# Patient Record
Sex: FEMALE | Race: WHITE | Employment: UNEMPLOYED | URBAN - METROPOLITAN AREA
[De-identification: names, ages, dates, MRNs, and addresses within clinical notes are randomized per-mention and may not be internally consistent; named-entity substitution may affect disease eponyms.]

---

## 2016-07-06 LAB
ANTIBODY SCREEN, EXTERNAL: NEGATIVE
CHLAMYDIA, EXTERNAL: NEGATIVE
HBSAG, EXTERNAL: NEGATIVE
HIV, EXTERNAL: NEGATIVE
N. GONORRHEA, EXTERNAL: NEGATIVE
RPR, EXTERNAL: NON REACTIVE
RUBELLA, EXTERNAL: NORMAL

## 2016-12-15 LAB — GRBS, EXTERNAL: POSITIVE

## 2017-01-15 ENCOUNTER — HOSPITAL ENCOUNTER (OUTPATIENT)
Age: 22
Setting detail: OBSERVATION
Discharge: HOME | End: 2017-01-15
Attending: OBSTETRICS & GYNECOLOGY | Admitting: OBSTETRICS & GYNECOLOGY
Payer: MEDICAID

## 2017-01-15 VITALS
TEMPERATURE: 98.2 F | HEIGHT: 68 IN | RESPIRATION RATE: 20 BRPM | SYSTOLIC BLOOD PRESSURE: 147 MMHG | WEIGHT: 232 LBS | HEART RATE: 96 BPM | BODY MASS INDEX: 35.16 KG/M2 | DIASTOLIC BLOOD PRESSURE: 80 MMHG

## 2017-01-15 PROCEDURE — 99218 HC RM OBSERVATION: CPT

## 2017-01-15 PROCEDURE — 99283 EMERGENCY DEPT VISIT LOW MDM: CPT | Performed by: OBSTETRICS & GYNECOLOGY

## 2017-01-15 PROCEDURE — 59025 FETAL NON-STRESS TEST: CPT | Performed by: OBSTETRICS & GYNECOLOGY

## 2017-01-15 NOTE — IP AVS SNAPSHOT
Michael Marquis 
 
 
 [address] 
[phone] Patient: Oc Villalobos MRN: [id number] :1995 You are allergic to the following Allergen Reactions Dilaudid (Hydromorphone (Bulk)) Rash Recent Documentation Height Weight BMI OB Status Smoking Status 1.727 m 105.2 kg 35.28 kg/m2 Pregnant Former Smoker Emergency Contacts Name Discharge Info Relation Home Work Mobile Sameera Cha  Parent [1] [phone] About your hospitalization You were admitted on:  January 15, 2017 You last received care in the:  [facility] 2 LABOR & DELIVERY You were discharged on:  January 15, 2017 Unit phone number:  [phone] Why you were hospitalized Your primary diagnosis was:  Not on File Providers Seen During Your Hospitalizations Provider Role Specialty Primary office phone Devaughn Montoya MD Attending Provider Obstetrics & Gynecology [phone] Your Primary Care Physician (PCP) Primary Care Physician Office Phone Office Fax Cody Bessie [phone] Follow-up Information Follow up With Details Comments Contact Info Devaughn Montoya MD   [address] [place] [address] 
[phone] Current Discharge Medication List  
  
STOP taking these medications FIORICET PO  
   
  
 ibuprofen 800 mg tablet Commonly known as:  MOTRIN  
   
  
 oxyCODONE-acetaminophen 5-325 mg per tablet Commonly known as:  PERCOCET Discharge Instructions Please keep you appointment on 2017 with your physician. Weeks 34 to 36 of Your Pregnancy: Care Instructions Your Care Instructions By now, your baby and your belly have grown quite large. It is almost time to give birth. A full-term pregnancy can deliver between 37 and 42 weeks. Your baby's lungs are almost ready to breathe air. The bones in your baby's head are now firm enough to protect it, but soft enough to move down through the birth canal. 
You may feel excited, happy, anxious, or scared. You may wonder how you will know if you are in labor or what to expect during labor. Try to be flexible in your expectations of the birth. Because each birth is different, there is no way to know exactly what childbirth will be like for you. This care sheet will help you know what to expect and how to prepare. This may make your childbirth easier. If you haven't already had the Tdap shot during this pregnancy, talk to your doctor about getting it. It will help protect your  against pertussis infection. In the 36th week, most women have a test for group B streptococcus (GBS). GBS is a common bacteria that can live in the vagina and rectum. It can make your baby sick after birth. If you test positive, you will get antibiotics during labor. The medicine will keep your baby from getting the bacteria. Follow-up care is a key part of your treatment and safety. Be sure to make and go to all appointments, and call your doctor if you are having problems. It's also a good idea to know your test results and keep a list of the medicines you take. How can you care for yourself at home? Learn about pain relief choices · Pain is different for every woman. Talk with your doctor about your feelings about pain. · You can choose from several types of pain relief. These include medicine or breathing techniques, as well as comfort measures. You can use more than one option. · If you choose to have pain medicine during labor, talk to your doctor about your options. Some medicines lower anxiety and help with some of the pain. Others make your lower body numb so that you won't feel pain.  
· Be sure to tell your doctor about your pain medicine choice before you start labor or very early in your labor. You may be able to change your mind as labor progresses. · Rarely, a woman is put to sleep by medicine given through a mask or an IV. Labor and delivery · The first stage of labor has three parts: early, active, and transition. ¨ Most women have early labor at home. You can stay busy or rest, eat light snacks, drink clear fluids, and start counting contractions. ¨ When talking during a contraction gets hard, you may be moving to active labor. During active labor, you should head for the hospital if you are not there already. ¨ You are in active labor when contractions come every 3 to 4 minutes and last about 60 seconds. Your cervix is opening more rapidly. ¨ If your water breaks, contractions will come faster and stronger. ¨ During transition, your cervix is stretching, and contractions are coming more rapidly. ¨ You may want to push, but your cervix might not be ready. Your doctor will tell you when to push. · The second stage starts when your cervix is completely opened and you are ready to push. ¨ Contractions are very strong to push the baby down the birth canal. 
¨ You will feel the urge to push. You may feel like you need to have a bowel movement. ¨ You may be coached to push with contractions. These contractions will be very strong, but you will not have them as often. You can get a little rest between contractions. ¨ You may be emotional and irritable. You may not be aware of what is going on around you. ¨ One last push, and your baby is born. · The third stage is when a few more contractions push out the placenta. This may take 30 minutes or less. · The fourth stage is the welcome recovery. You may feel overwhelmed with emotions and exhausted but alert. This is a good time to start breastfeeding. Where can you learn more? Go to http://dante-rachel.info/.  
Enter M410 in the search box to learn more about \"Weeks 34 to 39 of Your Pregnancy: Care Instructions. \" Current as of: May 30, 2016 Content Version: 11.1 © 7428-2256 [employer]. Care instructions adapted under license by [employer] (which disclaims liability or warranty for this information). If you have questions about a medical condition or this instruction, always ask your healthcare professional. [address] any warranty or liability for your use of this information. Counting Your Baby's Kicks: Care Instructions Your Care Instructions Counting your baby's kicks is one way your doctor can tell that your baby is healthy. Most womenespecially in a first pregnancyfeel their baby move for the first time between 16 and 22 weeks. The movement may feel like flutters rather than kicks. Your baby may move more at certain times of the day. When you are active, you may notice less kicking than when you are resting. At your prenatal visits, your doctor will ask whether the baby is active. In your last trimester, your doctor may ask you to count the number of times you feel your baby move. Follow-up care is a key part of your treatment and safety. Be sure to make and go to all appointments, and call your doctor if you are having problems. It's also a good idea to know your test results and keep a list of the medicines you take. How do you count fetal kicks? · A common method of checking your baby's movement is to count the number of kicks or moves you feel in 1 hour. Ten movements (such as kicks, flutters, or rolls) in 1 hour are normal. Some doctors suggest that you count in the morning until you get to 10 movements. Then you can quit for that day and start again the next day. · Pick your baby's most active time of day to count. This may be any time from morning to evening. · If you do not feel 10 movements in an hour, your baby may be sleeping. Wait for the next hour and count again. When should you call for help? Call your doctor now or seek immediate medical care if: 
· You noticed that your baby has stopped moving or is moving much less than normal. 
Watch closely for changes in your health, and be sure to contact your doctor if you have any problems. Where can you learn more? Go to http://dante-rachel.info/. Enter N501 in the search box to learn more about \"Counting Your Baby's Kicks: Care Instructions. \" Current as of: May 30, 2016 Content Version: 11.1 © 7263-5494 [employer]. Care instructions adapted under license by [employer]icle (which disclaims liability or warranty for this information). If you have questions about a medical condition or this instruction, always ask your healthcare professional. [address] any warranty or liability for your use of this information. Discharge Orders None Introducing [facility]! Kris Lawrence introduces MyFitnessPal patient portal. Now you can access parts of your medical record, email your doctor's office, and request medication refills online. 1. In your internet browser, go to [email or web address] 2. Click on the First Time User? Click Here link in the Sign In box. You will see the New Member Sign Up page. 3. Enter your MyFitnessPal Access Code exactly as it appears below. You will not need to use this code after youve completed the sign-up process. If you do not sign up before the expiration date, you must request a new code. · MyFitnessPal Access Code: [email or web address] Expires: 4/15/2017  7:16 PM 
 
4. Enter the last four digits of your Social Security Number (xxxx) and Date of Birth (mm/dd/yyyy) as indicated and click Submit. You will be taken to the next sign-up page. 5. Create a FedTaxt ID. This will be your MyFitnessPal login ID and cannot be changed, so think of one that is secure and easy to remember. 6. Create a FedTaxt password. You can change your password at any time. 7. Enter your Password Reset Question and Answer. This can be used at a later time if you forget your password. 8. Enter your e-mail address. You will receive e-mail notification when new information is available in [address] 9. Click Sign Up. You can now view and download portions of your medical record. 10. Click the Download Summary menu link to download a portable copy of your medical information. If you have questions, please visit the Frequently Asked Questions section of the [employer] website. Remember, [employer] is NOT to be used for urgent needs. For medical emergencies, dial 911. Now available from your iPhone and Android! General Information Please provide this summary of care documentation to your next provider. Patient Signature:  ____________________________________________________________ Date:  ____________________________________________________________  
  
[employer] Provider Signature:  ____________________________________________________________ Date:  ____________________________________________________________

## 2017-01-16 NOTE — H&P
History & Physical    Name: Jay Doherty MRN: [id number]  SSN: [id number]    YOB: 1995  Age: 19 y.o. Sex: female        Subjective:     Estimated Date of Delivery: 17  OB History    Para Term  AB SAB TAB Ectopic Multiple Living   3 1 1       1      # Outcome Date GA Lbr Kavin/2nd Weight Sex Delivery Anes PTL Lv   3 Current            2 Term 10/31/11 40w0d 12:15 / 00:56 3.365 kg M VAGINAL DELI EPIDURAL AN N Y   1                    Ms. Mat Hernandez is admitted with pregnancy at 35w5d for labor check. Prenatal course was normal. Please see prenatal records for details. Past Medical History   Diagnosis Date    Gestational hypertension 2015    Skin abscess      grew slight MRSA     No past surgical history on file. Social History     Occupational History     [employer]     Social History Main Topics    Smoking status: Former Smoker     Packs/day: 0.50     Types: Cigarettes     Quit date: 2012    Smokeless tobacco: Never Used    Alcohol use No    Drug use: No    Sexual activity: Yes     Partners: Male     Birth control/ protection: Condom     Family History   Problem Relation Age of Onset    Hypertension Father        Allergies   Allergen Reactions    Dilaudid [Hydromorphone (Bulk)] Rash     Prior to Admission medications    Medication Sig Start Date End Date Taking? Authorizing Provider   ibuprofen (MOTRIN) 800 mg tablet Take 1 Tab by mouth every eight (8) hours as needed for Pain. 3/9/15   Catalina Bender MD   oxyCODONE-acetaminophen (PERCOCET) 5-325 mg per tablet Take 2 Tabs by mouth every six (6) hours as needed. Max Daily Amount: 8 Tabs. 3/9/15   Catalina Bender MD   BUTALB/ACETAMINOPHEN/CAFFEINE (FIORICET PO) Take 1 capsule by mouth every eight (8) hours as needed (headache).     Historical Provider        Review of Systems: Constitutional: negative  Respiratory: negative  Cardiovascular: negative  Gastrointestinal: negative  Genitourinary:negative  Hematologic/lymphatic: negative  Musculoskeletal:negative  Neurological: negative  Behavioral/Psych: negative  Endocrine: negative    Objective:     Vitals:  Vitals:    01/15/17 1819 01/15/17 1821 01/15/17 1822   BP: 147/80  147/80   Pulse: 96  96   Resp:   20   Temp:   98.2 °F (36.8 °C)   Weight:  105.2 kg (232 lb)    Height:  5' 8\" (1.727 m)         Physical Exam:  Patient without distress. Heart: Regular rate and rhythm  Lung: clear to auscultation throughout lung fields and normal respiratory effort  Abdomen: soft, nontender  Fundus: soft and non tender  Perineum: blood absent, amniotic fluid absent  Cervical Exam: Closed/Thick/High  Membranes:  Intact  Fetal Heart Rate: cat I tracing without decels mild ctx noted    Prenatal Labs:   Lab Results   Component Value Date/Time    Rubella, External immune 07/06/2016    GrBStrep, External positive 12/15/2016    HBsAg, External negative 07/06/2016    HIV, External negative 07/06/2016    RPR, External non reactive 07/06/2016    Gonorrhea, External negative 07/06/2016    Chlamydia, External negative 07/06/2016    ABO,Rh A positive 03/30/2011         Assessment/Plan:     Active Problems:    * No active hospital problems.  *       Plan: Patient dehydrated po hydrated feeling better Discharge to home with office f/u    Signed By:  Margarita David MD     January 15, 2017

## 2017-01-16 NOTE — PROGRESS NOTES
1900  Bedside and Verbal shift change report given to Sravani Aden RN (oncoming nurse) by Deniz Hall RN (offgoing nurse). Report included the following information SBAR, Kardex, Intake/Output, MAR, Recent Results and Med Rec Status. Patient in bed, family at bedside, drinking water and states that she feels much better. No needs expressed at this time. 1902  Dr Radha Palacio at nurses station, updated on patient, she went in to see patient and discuss discharge.

## 2017-01-16 NOTE — DISCHARGE INSTRUCTIONS
Please keep you appointment on 2017 with your physician. Weeks 34 to 36 of Your Pregnancy: Care Instructions  Your Care Instructions     By now, your baby and your belly have grown quite large. It is almost time to give birth. A full-term pregnancy can deliver between 37 and 42 weeks. Your baby's lungs are almost ready to breathe air. The bones in your baby's head are now firm enough to protect it, but soft enough to move down through the birth canal.  You may feel excited, happy, anxious, or scared. You may wonder how you will know if you are in labor or what to expect during labor. Try to be flexible in your expectations of the birth. Because each birth is different, there is no way to know exactly what childbirth will be like for you. This care sheet will help you know what to expect and how to prepare. This may make your childbirth easier. If you haven't already had the Tdap shot during this pregnancy, talk to your doctor about getting it. It will help protect your  against pertussis infection. In the 36th week, most women have a test for group B streptococcus (GBS). GBS is a common bacteria that can live in the vagina and rectum. It can make your baby sick after birth. If you test positive, you will get antibiotics during labor. The medicine will keep your baby from getting the bacteria. Follow-up care is a key part of your treatment and safety. Be sure to make and go to all appointments, and call your doctor if you are having problems. It's also a good idea to know your test results and keep a list of the medicines you take. How can you care for yourself at home? Learn about pain relief choices  · Pain is different for every woman. Talk with your doctor about your feelings about pain. · You can choose from several types of pain relief. These include medicine or breathing techniques, as well as comfort measures. You can use more than one option.   · If you choose to have pain medicine during labor, talk to your doctor about your options. Some medicines lower anxiety and help with some of the pain. Others make your lower body numb so that you won't feel pain. · Be sure to tell your doctor about your pain medicine choice before you start labor or very early in your labor. You may be able to change your mind as labor progresses. · Rarely, a woman is put to sleep by medicine given through a mask or an IV. Labor and delivery  · The first stage of labor has three parts: early, active, and transition. ¨ Most women have early labor at home. You can stay busy or rest, eat light snacks, drink clear fluids, and start counting contractions. ¨ When talking during a contraction gets hard, you may be moving to active labor. During active labor, you should head for the hospital if you are not there already. ¨ You are in active labor when contractions come every 3 to 4 minutes and last about 60 seconds. Your cervix is opening more rapidly. ¨ If your water breaks, contractions will come faster and stronger. ¨ During transition, your cervix is stretching, and contractions are coming more rapidly. ¨ You may want to push, but your cervix might not be ready. Your doctor will tell you when to push. · The second stage starts when your cervix is completely opened and you are ready to push. ¨ Contractions are very strong to push the baby down the birth canal.  ¨ You will feel the urge to push. You may feel like you need to have a bowel movement. ¨ You may be coached to push with contractions. These contractions will be very strong, but you will not have them as often. You can get a little rest between contractions. ¨ You may be emotional and irritable. You may not be aware of what is going on around you. ¨ One last push, and your baby is born. · The third stage is when a few more contractions push out the placenta. This may take 30 minutes or less. · The fourth stage is the welcome recovery. You may feel overwhelmed with emotions and exhausted but alert. This is a good time to start breastfeeding. Where can you learn more? Go to http://dante-rachel.info/. Enter G882 in the search box to learn more about \"Weeks 34 to 36 of Your Pregnancy: Care Instructions. \"  Current as of: May 30, 2016  Content Version: 11.1  © 2327-4048 [employer]. Care instructions adapted under license by Vision Source (which disclaims liability or warranty for this information). If you have questions about a medical condition or this instruction, always ask your healthcare professional. [place] [address] any warranty or liability for your use of this information. Counting Your Baby's Kicks: Care Instructions  Your Care Instructions  Counting your baby's kicks is one way your doctor can tell that your baby is healthy. Most women--especially in a first pregnancy--feel their baby move for the first time between 16 and 22 weeks. The movement may feel like flutters rather than kicks. Your baby may move more at certain times of the day. When you are active, you may notice less kicking than when you are resting. At your prenatal visits, your doctor will ask whether the baby is active. In your last trimester, your doctor may ask you to count the number of times you feel your baby move. Follow-up care is a key part of your treatment and safety. Be sure to make and go to all appointments, and call your doctor if you are having problems. It's also a good idea to know your test results and keep a list of the medicines you take. How do you count fetal kicks? · A common method of checking your baby's movement is to count the number of kicks or moves you feel in 1 hour. Ten movements (such as kicks, flutters, or rolls) in 1 hour are normal. Some doctors suggest that you count in the morning until you get to 10 movements.  Then you can quit for that day and start again the next day.  · Pick your baby's most active time of day to count. This may be any time from morning to evening. · If you do not feel 10 movements in an hour, your baby may be sleeping. Wait for the next hour and count again. When should you call for help? Call your doctor now or seek immediate medical care if:  · You noticed that your baby has stopped moving or is moving much less than normal.  Watch closely for changes in your health, and be sure to contact your doctor if you have any problems. Where can you learn more? Go to http://dante-rachel.info/. Enter Y433 in the search box to learn more about \"Counting Your Baby's Kicks: Care Instructions. \"  Current as of: May 30, 2016  Content Version: 11.1  © 6597-7068 [employer]. Care instructions adapted under license by BiiCode (which disclaims liability or warranty for this information). If you have questions about a medical condition or this instruction, always ask your healthcare professional. [address] any warranty or liability for your use of this information.

## 2017-01-27 ENCOUNTER — HOSPITAL ENCOUNTER (OUTPATIENT)
Age: 22
Setting detail: OBSERVATION
Discharge: HOME | End: 2017-01-27
Attending: OBSTETRICS & GYNECOLOGY | Admitting: OBSTETRICS & GYNECOLOGY
Payer: MEDICAID

## 2017-01-27 VITALS
DIASTOLIC BLOOD PRESSURE: 61 MMHG | WEIGHT: 236 LBS | BODY MASS INDEX: 35.77 KG/M2 | TEMPERATURE: 98.2 F | HEIGHT: 68 IN | RESPIRATION RATE: 16 BRPM | SYSTOLIC BLOOD PRESSURE: 121 MMHG | HEART RATE: 80 BPM

## 2017-01-27 LAB
ALBUMIN SERPL BCP-MCNC: 2.4 G/DL (ref 3.5–5)
ALBUMIN/GLOB SERPL: 0.7 {RATIO} (ref 1.1–2.2)
ALP SERPL-CCNC: 99 U/L (ref 45–117)
ALT SERPL-CCNC: 15 U/L (ref 12–78)
ANION GAP BLD CALC-SCNC: 11 MMOL/L (ref 5–15)
AST SERPL W P-5'-P-CCNC: 13 U/L (ref 15–37)
BASOPHILS # BLD AUTO: 0 K/UL (ref 0–0.1)
BASOPHILS # BLD: 0 % (ref 0–1)
BILIRUB SERPL-MCNC: 0.2 MG/DL (ref 0.2–1)
BUN SERPL-MCNC: 4 MG/DL (ref 6–20)
BUN/CREAT SERPL: 7 (ref 12–20)
CALCIUM SERPL-MCNC: 7.8 MG/DL (ref 8.5–10.1)
CHLORIDE SERPL-SCNC: 102 MMOL/L (ref 97–108)
CO2 SERPL-SCNC: 25 MMOL/L (ref 21–32)
CREAT SERPL-MCNC: 0.54 MG/DL (ref 0.55–1.02)
CREAT UR-MCNC: 23.49 MG/DL
EOSINOPHIL # BLD: 0 K/UL (ref 0–0.4)
EOSINOPHIL NFR BLD: 1 % (ref 0–7)
ERYTHROCYTE [DISTWIDTH] IN BLOOD BY AUTOMATED COUNT: 14.3 % (ref 11.5–14.5)
GLOBULIN SER CALC-MCNC: 3.5 G/DL (ref 2–4)
GLUCOSE SERPL-MCNC: 69 MG/DL (ref 65–100)
HCT VFR BLD AUTO: 35.7 % (ref 35–47)
HGB BLD-MCNC: 11.2 G/DL (ref 11.5–16)
LDH SERPL L TO P-CCNC: 133 U/L (ref 81–246)
LYMPHOCYTES # BLD AUTO: 16 % (ref 12–49)
LYMPHOCYTES # BLD: 1.2 K/UL (ref 0.8–3.5)
MCH RBC QN AUTO: 26.4 PG (ref 26–34)
MCHC RBC AUTO-ENTMCNC: 31.4 G/DL (ref 30–36.5)
MCV RBC AUTO: 84 FL (ref 80–99)
MONOCYTES # BLD: 0.7 K/UL (ref 0–1)
MONOCYTES NFR BLD AUTO: 9 % (ref 5–13)
NEUTS SEG # BLD: 5.7 K/UL (ref 1.8–8)
NEUTS SEG NFR BLD AUTO: 74 % (ref 32–75)
PLATELET # BLD AUTO: 177 K/UL (ref 150–400)
POTASSIUM SERPL-SCNC: 3.8 MMOL/L (ref 3.5–5.1)
PROT SERPL-MCNC: 5.9 G/DL (ref 6.4–8.2)
PROT UR-MCNC: <6 MG/DL (ref 0–11.9)
PROT/CREAT UR-RTO: NORMAL
RBC # BLD AUTO: 4.25 M/UL (ref 3.8–5.2)
SODIUM SERPL-SCNC: 138 MMOL/L (ref 136–145)
URATE SERPL-MCNC: 2.8 MG/DL (ref 2.6–6)
WBC # BLD AUTO: 7.7 K/UL (ref 3.6–11)

## 2017-01-27 PROCEDURE — 85025 COMPLETE CBC W/AUTO DIFF WBC: CPT | Performed by: OBSTETRICS & GYNECOLOGY

## 2017-01-27 PROCEDURE — 84156 ASSAY OF PROTEIN URINE: CPT | Performed by: OBSTETRICS & GYNECOLOGY

## 2017-01-27 PROCEDURE — 36415 COLL VENOUS BLD VENIPUNCTURE: CPT | Performed by: OBSTETRICS & GYNECOLOGY

## 2017-01-27 PROCEDURE — 99285 EMERGENCY DEPT VISIT HI MDM: CPT | Performed by: OBSTETRICS & GYNECOLOGY

## 2017-01-27 PROCEDURE — 80053 COMPREHEN METABOLIC PANEL: CPT | Performed by: OBSTETRICS & GYNECOLOGY

## 2017-01-27 PROCEDURE — 83615 LACTATE (LD) (LDH) ENZYME: CPT | Performed by: OBSTETRICS & GYNECOLOGY

## 2017-01-27 PROCEDURE — 99218 HC RM OBSERVATION: CPT

## 2017-01-27 PROCEDURE — 59025 FETAL NON-STRESS TEST: CPT | Performed by: OBSTETRICS & GYNECOLOGY

## 2017-01-27 PROCEDURE — 84550 ASSAY OF BLOOD/URIC ACID: CPT | Performed by: OBSTETRICS & GYNECOLOGY

## 2017-01-27 NOTE — PROGRESS NOTES
1033: Patient arrived to unit. Oriented to unit, nurse, and plan of care. Patient verbalized understanding. 1115-urine pro/cr ratio sent to lab. 24 hour started at this patient. Patient instructed how to collect 24 hour urine. 12:20- received a call from lab stating that the urine pro/cr ratio could not be calculated because the the protein was too low. 12:23-RN notified patient of labs. Patient wants to know if she can go home and will she have to complete a 24 hour urine. 1230-Jannie MANNING on floor notified of BPs, CMP, CBC, Pro/Cr ratio unable to obtain because of low Protein. Jannie MANNING states she will d/c patient and that patient does not need to complete her 24 hour urine. VORB.     1300-Written and Verbal Discharge instructions on pregnancy precautions, fetal movement counts, hypertension/pre-E. Patient verbalized understanding. Copy given to patient and original placed on chart.

## 2017-01-27 NOTE — PROGRESS NOTES
Pt seen and examined. She was sent for r/o preE after having a mild range blood pressure in the office. States she was seen for a problem visit for cramping and pelvic pressure, but currently feels better. Here, she has had all normal BPs. Her preE serology has returned all normal and her urine protein was so low that a urine p/c ratio could not even be calculated. FHR cat 1, reactive  Will discharge home with preE precautions and FKCs. She understands and feels comfortable going home. She has an appointment in the office with Dr. Crissy House in 4 days.     Bonny Roblero MD

## 2017-01-27 NOTE — DISCHARGE INSTRUCTIONS
High Blood Pressure in Pregnancy: Care Instructions  Your Care Instructions  High blood pressure (hypertension) means that the force of blood against your artery walls is too strong. Mild high blood pressure during pregnancy is not usually dangerous. Your doctor will probably just want to watch you closely. But when blood pressure is very high, it can reduce oxygen to your baby. This can affect how well your baby grows. High blood pressure also means that you are at higher risk for:  · Preeclampsia. This is a problem that includes high blood pressure and damage to your liver or kidneys. It can also reduce how much oxygen your baby gets. In some cases, it leads to eclampsia. Eclampsia causes seizures. · Placental abruption. This is a problem when the placenta separates from the uterus before birth. It prevents the baby from getting enough oxygen and nutrients. Sometimes it can cause death for the baby and the mother. To prevent problems for you or your baby, you will have to check your blood pressure often. You will do this until after your baby is born. If your blood pressure rises suddenly or is very high during your pregnancy, your doctor may prescribe medicines. They can usually control blood pressure. If your blood pressure affects your or your baby's health, your doctor may need to deliver your baby early. After your baby is born, your blood pressure will probably improve. But sometimes blood pressure problems continue after birth. Follow-up care is a key part of your treatment and safety. Be sure to make and go to all appointments, and call your doctor if you are having problems. It's also a good idea to know your test results and keep a list of the medicines you take. How can you care for yourself at home? · Take and write down your blood pressure at home if your doctor tells you to. · Take your medicines exactly as prescribed.  Call your doctor if you think you are having a problem with your medicine. · Do not smoke. If you need help quitting, talk to your doctor about stop-smoking programs and medicines. These can increase your chances of quitting for good. · Do not gain too much weight during your pregnancy. Talk to your doctor about how much weight gain is healthy. · Eat a healthy diet. · Don't use a lot of salt. Take the salt shaker off the table. · Get regular, mild exercise. Walking or swimming several times a week can be healthy for you and your baby. · Reduce stress, and find time to relax. This is very important if you continue to work or have a busy schedule. It's also important if you have small children at home. When should you call for help? Call 911 anytime you think you may need emergency care. For example, call if:  · You passed out (lost consciousness). · You have a seizure. Call your doctor now or seek immediate medical care if:  · You have symptoms of preeclampsia, such as:  ¨ Sudden swelling of your face, hands, or feet. ¨ New vision problems (such as dimness or blurring). ¨ A severe headache. · Your blood pressure is higher than it should be or rises suddenly. · You have new nausea or vomiting. · You think that you are in labor. · You have new belly pain. Watch closely for changes in your health, and be sure to contact your doctor if:  · You gain weight rapidly. Where can you learn more? Go to http://dante-rachel.info/. Enter [phone] in the search box to learn more about \"High Blood Pressure in Pregnancy: Care Instructions. \"  Current as of: May 30, 2016  Content Version: 11.1  © 6307-6888 [employer]. Care instructions adapted under license by [employer] (which disclaims liability or warranty for this information). If you have questions about a medical condition or this instruction, always ask your healthcare professional. [address] any warranty or liability for your use of this information. Pregnancy Precautions: Care Instructions  Your Care Instructions  There is no sure way to prevent labor before your due date ( labor) or to prevent most other pregnancy problems. But there are things you can do to increase your chances of a healthy pregnancy. Go to your appointments, follow your doctor's advice, and take good care of yourself. Eat well, and exercise (if your doctor agrees). And make sure to drink plenty of water. Follow-up care is a key part of your treatment and safety. Be sure to make and go to all appointments, and call your doctor if you are having problems. It's also a good idea to know your test results and keep a list of the medicines you take. How can you care for yourself at home? · Make sure you go to your prenatal appointments. At each visit, your doctor will check your blood pressure. Your doctor will also check to see if you have protein in your urine. High blood pressure and protein in urine are signs of preeclampsia. This condition can be dangerous for you and your baby. · Drink plenty of fluids, enough so that your urine is light yellow or clear like water. Dehydration can cause contractions. If you have kidney, heart, or liver disease and have to limit fluids, talk with your doctor before you increase the amount of fluids you drink. · Tell your doctor right away if you notice any symptoms of an infection, such as:  ¨ Burning when you urinate. ¨ A foul-smelling discharge from your vagina. ¨ Vaginal itching. ¨ Unexplained fever. ¨ Unusual pain or soreness in your uterus or lower belly. · Eat a balanced diet. Include plenty of foods that are high in calcium and iron. ¨ Foods high in calcium include milk, cheese, yogurt, almonds, and broccoli. ¨ Foods high in iron include red meat, shellfish, poultry, eggs, beans, raisins, whole-grain bread, and leafy green vegetables. · Do not smoke.  If you need help quitting, talk to your doctor about stop-smoking programs and medicines. These can increase your chances of quitting for good. · Do not drink alcohol or use illegal drugs. · Follow your doctor's directions about activity. Your doctor will let you know how much, if any, exercise you can do. · Ask your doctor if you can have sex. If you are at risk for early labor, your doctor may ask you to not have sex. · Take care to prevent falls. During pregnancy, your joints are loose, and your balance is off. Sports such as bicycling, skiing, or in-line skating can increase your risk of falling. And don't ride horses or motorcycles, dive, water ski, scuba dive, or parachute jump while you are pregnant. · Avoid getting very hot. Do not use saunas or hot tubs. Avoid staying out in the sun in hot weather for long periods. Take acetaminophen (Tylenol) to lower a high fever. · Do not take any over-the-counter or herbal medicines or supplements without talking to your doctor or pharmacist first.  When should you call for help? Call 911 anytime you think you may need emergency care. For example, call if:  · You passed out (lost consciousness). · You have severe vaginal bleeding. · You have severe pain in your belly or pelvis. · You have had fluid gushing or leaking from your vagina and you know or think the umbilical cord is bulging into your vagina. If this happens, immediately get down on your knees so your rear end (buttocks) is higher than your head. This will decrease the pressure on the cord until help arrives. Call your doctor now or seek immediate medical care if:  · You have signs of preeclampsia, such as:  ¨ Sudden swelling of your face, hands, or feet. ¨ New vision problems (such as dimness or blurring). ¨ A severe headache. · You have any vaginal bleeding. · You have belly pain or cramping. · You have a fever. · You have had regular contractions (with or without pain) for an hour.  This means that you have 8 or more within 1 hour or 4 or more in 20 minutes after you change your position and drink fluids. · You have a sudden release of fluid from your vagina. · You have low back pain or pelvic pressure that does not go away. · You notice that your baby has stopped moving or is moving much less than normal.  Watch closely for changes in your health, and be sure to contact your doctor if you have any problems. Where can you learn more? Go to http://dante-rachel.info/. Enter [id number] in the search box to learn more about \"Pregnancy Precautions: Care Instructions. \"  Current as of: May 30, 2016  Content Version: 11.1  © 1330-1335 [employer]. Care instructions adapted under license by [employer] (which disclaims liability or warranty for this information). If you have questions about a medical condition or this instruction, always ask your healthcare professional. [address] any warranty or liability for your use of this information. Counting Your Baby's Kicks: Care Instructions  Your Care Instructions  Counting your baby's kicks is one way your doctor can tell that your baby is healthy. Most women--especially in a first pregnancy--feel their baby move for the first time between 16 and 22 weeks. The movement may feel like flutters rather than kicks. Your baby may move more at certain times of the day. When you are active, you may notice less kicking than when you are resting. At your prenatal visits, your doctor will ask whether the baby is active. In your last trimester, your doctor may ask you to count the number of times you feel your baby move. Follow-up care is a key part of your treatment and safety. Be sure to make and go to all appointments, and call your doctor if you are having problems. It's also a good idea to know your test results and keep a list of the medicines you take. How do you count fetal kicks?   · A common method of checking your baby's movement is to count the number of kicks or moves you feel in 1 hour. Ten movements (such as kicks, flutters, or rolls) in 1 hour are normal. Some doctors suggest that you count in the morning until you get to 10 movements. Then you can quit for that day and start again the next day. · Pick your baby's most active time of day to count. This may be any time from morning to evening. · If you do not feel 10 movements in an hour, your baby may be sleeping. Wait for the next hour and count again. When should you call for help? Call your doctor now or seek immediate medical care if:  · You noticed that your baby has stopped moving or is moving much less than normal.  Watch closely for changes in your health, and be sure to contact your doctor if you have any problems. Where can you learn more? Go to http://dante-rachel.info/. Enter O999 in the search box to learn more about \"Counting Your Baby's Kicks: Care Instructions. \"  Current as of: May 30, 2016  Content Version: 11.1  © 4296-1138 [employer]. Care instructions adapted under license by [employer] (which disclaims liability or warranty for this information). If you have questions about a medical condition or this instruction, always ask your healthcare professional. [address] any warranty or liability for your use of this information.

## 2017-01-27 NOTE — H&P
EDC:2017  EGA: 37 weeks, 3 days      Vital Signs   19Years Old Female  Weight: 236 pounds  BP:       150/92    Pap/HPV/Gardasil History   History of abnormal pap: no  Gardasil Injection History: In Process          Past Pregnancy History      :  3     Term Births:  2     Premature Births: 0     Living Children: 2     Para:   2     Mult. Births:  0     Prev : 0     Aborta:  0     Elect. Ab:  0     Spont. Ab:  0     Ectopics:  0    Pregnancy # 1     Delivery date:   10/31/2011     Weeks Gestation: 40      labor:   no     Delivery type:        Hours of labor:   14     Anesthesia type:   epidural     Delivery location:   Other     Infant Sex:  Male     Birth weight:  7.7      Name:  Francine Snowden    Pregnancy # 2     Delivery date:   2015     Weeks Gestation: 38.5     Delivery type:        Delivery location:        Infant Sex:  Female     Birth weight:  3.36 kg     Name:  Kiet Marie      Comments:  Nwadisupriya - Apgars 9/9, GBS +    Pregnancy # 3     Comments:  current         Allergies      Medications Removed from Medication List        [facility] for Follow-up Visit     Estimated weeks of        gestation:  37 3/7     Weight:  236     Blood pressure: 150 / 92     Headache:  No     Nausea/vomiting: No     Edema:  0     Vaginal bleeding: no     Vaginal discharge: d/c     Fetal activity:  yes     FHR:   145     Labor symptoms: few ctx     Cx Dilation:  2     Cx Effacement: 20%     Cx Station:  -2     Next visit:  1 wk     Preceptor:  bunny     Comment:  pressure, pain, lower back/lower abdomen, not in labor. Repeat /100, to L&D for r/o pre-e. Impression & Recommendations:    Problem # 1:  Gestational hypertension (ICD-642.33) (FNI28-R06.3)  Here for contractions, low back pain. Not found to be in labor. Is found to be persistently hypertensive (150/90, 160/100)  WIll plan growth and BPP in office today, and then send to L&D to r/o pre-e with labs, UPCR, 24 hour urine. prolonged monitoring. Orders:  Patient Sent to L&D (CPT-LD)  Sent to L&D  (CPT-AdmitF)  Growth + BPP No MFM (xxxx)      Problem # 2:  GBS positive RX in labor (BOH-970.01) (TLW32-Z08.58)    Problem # 3:  MRSA personal history,, negative 35 week culture (ICD-041.19) (WHV36-L56.36)  Negative culture on 1/10/17      Medications (at conclusion of this visit)    2016 AMBIEN 5 MG TABS (ZOLPIDEM TARTRATE) 1 po at bedtime as needed for sleep  06/10/2016 * PNV           Visit Type:  Prenatal  Primary Provider:  Yumiko Allen MD    CC:  Pain. History of Present Illness: The patient presents today for obstetric evaluation. The patient complains of abdominal pain, back pain, edema, pelvic pressure and uterine contractions. Vaginal bleeding is not present. Fetal movement is present. SHe is found to be hypertensive.           Past Medical History:     Reviewed history from 2014 and no changes required:        History of Methicillin Resistant Staphylococcus Aureus (MRSA)     Past Surgical History:     Reviewed history from 2015 and no changes required:        Vaginal x 1         female [id number] [facility] (Hospitalist)    Family History Summary:      Reviewed history Last on 2016 and no changes required:2017      General Comments - FH:  Family history transferred to [address] [place] [address]       Social History:     Reviewed history from 2014 and no changes required:         at [facility]         Stable Relationship       Previous Tobacco Use: Signed On - 2016  Smoked Tobacco Use:  Never smoker     Counseled to quit/cut down:  yes  Passive smoke exposure:  no  Drug use:  no  HIV high-risk behavior:  No  Caffeine use:  0-1 drinks per day    Previous Alcohol Use: Signed On - 2016  Alcohol use:  yes     Drinks per day:  rarely   Exercise:  yes     Times per week:  3     Type of Exercise:  walking   Seatbelt use:  100 %  Sun Exposure:  Rarely    PAP Smear History:     Date of Last PAP Smear:  07/06/2016      Review of Systems        See HPI      Vital Signs    Blood Pressure: 150 / 92    Weight:  236 pounds      Physical Exam     General           General appearance:  no acute distress    Head           Inspection:   normal    Eyes           External:   EOM intact    ENT           Dental:   adequate dentition    Chest           Lungs:  clear to auscultation          Heart:  regular rate and rhythm    Extremeties           Extremeties:  0 edema    Neurological           Reflexes:  2+ and symmetric with no pathological reflexes    Psych           Orientation:  oriented to time, place, and person          Mood:  no appearance of anxiety, depression, or agitation    Lymph           Inguinal:  no inguinal adenopathy    Skin           Inspection:  no rashes, suspicious lesions, or ulcerations    Abdomen           Abdomen:  gravid    Pelvic Exam           EGBUS:  no lesions          Vagina:  normal appearing without lesions or discharge          Uterus:  gravid          Cervix:  no lesions or discharge                  Dilation: : 2                  Effacement:  20%                  Station:  -2    Allergies      Medications Removed from Medication List        Impression & Recommendations:    Problem # 1:  Gestational hypertension (ICD-642.33) (VZQ92-P96.3)  Here for contractions, low back pain. Not found to be in labor. Is found to be persistently hypertensive (150/90, 160/100)  WIll plan growth and BPP in office today, and then send to L&D to r/o pre-e with labs, UPCR, 24 hour urine. prolonged monitoring.       Orders:  Patient Sent to L&D (CPT-LD)  Sent to L&D  (CPT-AdmitF)  Growth + BPP No MFM (xxxx)      Problem # 2:  GBS positive RX in labor (QDO-367.05) (JEI36-F62.51)    Problem # 3:  MRSA personal history,, negative 35 week culture (ICD-041.19) (YCZ30-M24.97)  Negative culture on 1/10/17      Medications (at conclusion of this visit)    11/03/2016 AMBIEN 5 MG TABS (ZOLPIDEM TARTRATE) 1 po at bedtime as needed for sleep  06/10/2016 * PNV           LABORATORY DATA   TEST DATE RESULT   Group B Strep culture 10/04/2016 GBS Urine                                   (Group B Strep Culture Result Field)   Blood Type 07/06/2016 A                                             (Blood Type Result Field)   Rh 07/06/2016 Positive                                   (Rh Result Field)   Rhogam Inj Given 03/07/2015 *   Tdap Vaccine Given 11/29/2016 Vacc. [facility] [facility]   Antibody Screen 11/29/2016 Negative   Rubella  [facility] Reference Ranges On or After 3/10/14                  <0.90              Non-immune      0.90 - 0.99     Equivocal      >0.99              Immune    [facility] Reference Ranges  Before 3/10/14           <5                 Non-immune             5 - 9               Equivocal            >9                 Immune  [facility] Reference Ranges       < Or = 0.90       Negative             0.91-1.09          Equivocal            > Or = 1.10       Positive   07/06/2016     1.46     TPA (T Pallidum Antibodies) 11/29/2016 Negative   Serology (RPR) 03/07/2015 *   HBsAg 07/06/2016 Negative   HIV 07/06/2016 Non Reactive   Hemoglobin 01/17/2017 11.7   Hematocrit 01/17/2017 35.9   Platelets 24/59/5828 214 X10E3/UL   TSH 03/07/2015 *   Urine Culture 10/04/2016 GBS   GC DNA Probe 07/06/2016 Negative   Chlamydia DNA 07/06/2016 Negative   PAP 07/06/2016 NIL   Flu Vaccine Given 11/03/2016 Vacc.  [facility]   HGBA1C 03/07/2015 *   HGB Electro     T4, Free 03/07/2015 *   BG Fasting 12/05/2016 74   GTT 1H 50G 11/29/2016 144   GTT 1H 100G 12/05/2016 111   GTT 2H 100G 12/05/2016 117   GTT 3H 100G 12/05/2016 68   Glucose Plasma 03/07/2015 *   CF Accept or Decline 08/10/2016 Accepted in 2014-neg for 32 mutations   CF Screen Result 10/15/2014 Negative   Nuchal Trans 08/05/2016 2.300 mm   AFP Only 08/26/2016 Declined   Tetra     AFP Serum 03/07/2015 *   CVS 08/10/2016 declined   AFP Amniotic 03/07/2015 *   Amnio Karyo 03/07/2015 *   FISH 03/07/2015 *   GC Culture 03/07/2015 *   Chlamydia Cult 03/07/2015 *   Ureaplasma     Mycoplasma     WBC 01/17/2017 7.2 X10E3/UL   RBC 01/17/2017 4.32 X10E6/UL   MCV 01/17/2017 83   MCH 01/17/2017 27.1   MCHC RBC 01/17/2017 32.6     ULTRASOUND DATA   TEST DATE RESULT   Estimated Fetal Weight 11/29/2016 [id number]                                     Weight % 11/29/2016 65th%%                                                RAFAL 11/29/2016 11.652204                    BPP 03/02/2015 8   Cervical Length (mm) 06/10/2016 37.000     ]      Electronically signed by Lorene Moreno MD on 01/27/2017 at 9:34 AM    ________________________________________________________________________

## 2017-01-27 NOTE — IP AVS SNAPSHOT
[address] [facility] 
 
 
 [address] [place] [address] 
[phone] Patient: Conner Sherwood MRN: [id number] :1995 You are allergic to the following Allergen Reactions Dilaudid (Hydromorphone (Bulk)) Rash Recent Documentation Height Weight BMI OB Status Smoking Status 1.727 m 107 kg 35.88 kg/m2 Pregnant Former Smoker Unresulted Labs Order Current Status PROTEIN, URINE, 24 HR Collected (17 1200) MISC. LAB TEST In process SAMPLE TO BLOOD BANK In process Emergency Contacts Name Discharge Info Relation Home Work Mobile Sameera Cha [1]   [phone] About your hospitalization You were admitted on:  2017 You last received care in the:  [facility] 2 LABOR & DELIVERY You were discharged on:  2017 Unit phone number:  [phone] Why you were hospitalized Your primary diagnosis was:  Not on File Providers Seen During Your Hospitalizations Provider Role Specialty Primary office phone Davina Jaffe MD Attending Provider Obstetrics & Gynecology [phone] Your Primary Care Physician (PCP) Primary Care Physician Office Phone Office Fax Bud Thompson [phone] Follow-up Information Follow up With Details Comments Contact Info Maia Williamson MD   [address] [facility] 
[phone] Current Discharge Medication List  
  
Notice You have not been prescribed any medications. Discharge Instructions High Blood Pressure in Pregnancy: Care Instructions Your Care Instructions High blood pressure (hypertension) means that the force of blood against your artery walls is too strong. Mild high blood pressure during pregnancy is not usually dangerous.  Your doctor will probably just want to watch you closely. But when blood pressure is very high, it can reduce oxygen to your baby. This can affect how well your baby grows. High blood pressure also means that you are at higher risk for: · Preeclampsia. This is a problem that includes high blood pressure and damage to your liver or kidneys. It can also reduce how much oxygen your baby gets. In some cases, it leads to eclampsia. Eclampsia causes seizures. · Placental abruption. This is a problem when the placenta separates from the uterus before birth. It prevents the baby from getting enough oxygen and nutrients. Sometimes it can cause death for the baby and the mother. To prevent problems for you or your baby, you will have to check your blood pressure often. You will do this until after your baby is born. If your blood pressure rises suddenly or is very high during your pregnancy, your doctor may prescribe medicines. They can usually control blood pressure. If your blood pressure affects your or your baby's health, your doctor may need to deliver your baby early. After your baby is born, your blood pressure will probably improve. But sometimes blood pressure problems continue after birth. Follow-up care is a key part of your treatment and safety. Be sure to make and go to all appointments, and call your doctor if you are having problems. It's also a good idea to know your test results and keep a list of the medicines you take. How can you care for yourself at home? · Take and write down your blood pressure at home if your doctor tells you to. · Take your medicines exactly as prescribed. Call your doctor if you think you are having a problem with your medicine. · Do not smoke. If you need help quitting, talk to your doctor about stop-smoking programs and medicines. These can increase your chances of quitting for good. · Do not gain too much weight during your pregnancy.  Talk to your doctor about how much weight gain is healthy. · Eat a healthy diet. · Don't use a lot of salt. Take the salt shaker off the table. · Get regular, mild exercise. Walking or swimming several times a week can be healthy for you and your baby. · Reduce stress, and find time to relax. This is very important if you continue to work or have a busy schedule. It's also important if you have small children at home. When should you call for help? Call 911 anytime you think you may need emergency care. For example, call if: 
· You passed out (lost consciousness). · You have a seizure. Call your doctor now or seek immediate medical care if: 
· You have symptoms of preeclampsia, such as: 
¨ Sudden swelling of your face, hands, or feet. ¨ New vision problems (such as dimness or blurring). ¨ A severe headache. · Your blood pressure is higher than it should be or rises suddenly. · You have new nausea or vomiting. · You think that you are in labor. · You have new belly pain. Watch closely for changes in your health, and be sure to contact your doctor if: 
· You gain weight rapidly. Where can you learn more? Go to http://dante-rachel.info/. Enter [phone] in the search box to learn more about \"High Blood Pressure in Pregnancy: Care Instructions. \" Current as of: May 30, 2016 Content Version: 11.1 © 7557-8921 Celladon. Care instructions adapted under license by [employer] (which disclaims liability or warranty for this information). If you have questions about a medical condition or this instruction, always ask your healthcare professional. [place] [address] any warranty or liability for your use of this information. Pregnancy Precautions: Care Instructions Your Care Instructions There is no sure way to prevent labor before your due date ( labor) or to prevent most other pregnancy problems.  But there are things you can do to increase your chances of a healthy pregnancy. Go to your appointments, follow your doctor's advice, and take good care of yourself. Eat well, and exercise (if your doctor agrees). And make sure to drink plenty of water. Follow-up care is a key part of your treatment and safety. Be sure to make and go to all appointments, and call your doctor if you are having problems. It's also a good idea to know your test results and keep a list of the medicines you take. How can you care for yourself at home? · Make sure you go to your prenatal appointments. At each visit, your doctor will check your blood pressure. Your doctor will also check to see if you have protein in your urine. High blood pressure and protein in urine are signs of preeclampsia. This condition can be dangerous for you and your baby. · Drink plenty of fluids, enough so that your urine is light yellow or clear like water. Dehydration can cause contractions. If you have kidney, heart, or liver disease and have to limit fluids, talk with your doctor before you increase the amount of fluids you drink. · Tell your doctor right away if you notice any symptoms of an infection, such as: ¨ Burning when you urinate. ¨ A foul-smelling discharge from your vagina. ¨ Vaginal itching. ¨ Unexplained fever. ¨ Unusual pain or soreness in your uterus or lower belly. · Eat a balanced diet. Include plenty of foods that are high in calcium and iron. ¨ Foods high in calcium include milk, cheese, yogurt, almonds, and broccoli. ¨ Foods high in iron include red meat, shellfish, poultry, eggs, beans, raisins, whole-grain bread, and leafy green vegetables. · Do not smoke. If you need help quitting, talk to your doctor about stop-smoking programs and medicines. These can increase your chances of quitting for good. · Do not drink alcohol or use illegal drugs. · Follow your doctor's directions about activity.  Your doctor will let you know how much, if any, exercise you can do. · Ask your doctor if you can have sex. If you are at risk for early labor, your doctor may ask you to not have sex. · Take care to prevent falls. During pregnancy, your joints are loose, and your balance is off. Sports such as bicycling, skiing, or in-line skating can increase your risk of falling. And don't ride horses or motorcycles, dive, water ski, scuba dive, or parachute jump while you are pregnant. · Avoid getting very hot. Do not use saunas or hot tubs. Avoid staying out in the sun in hot weather for long periods. Take acetaminophen (Tylenol) to lower a high fever. · Do not take any over-the-counter or herbal medicines or supplements without talking to your doctor or pharmacist first. 
When should you call for help? Call 911 anytime you think you may need emergency care. For example, call if: 
· You passed out (lost consciousness). · You have severe vaginal bleeding. · You have severe pain in your belly or pelvis. · You have had fluid gushing or leaking from your vagina and you know or think the umbilical cord is bulging into your vagina. If this happens, immediately get down on your knees so your rear end (buttocks) is higher than your head. This will decrease the pressure on the cord until help arrives. Call your doctor now or seek immediate medical care if: 
· You have signs of preeclampsia, such as: 
¨ Sudden swelling of your face, hands, or feet. ¨ New vision problems (such as dimness or blurring). ¨ A severe headache. · You have any vaginal bleeding. · You have belly pain or cramping. · You have a fever. · You have had regular contractions (with or without pain) for an hour. This means that you have 8 or more within 1 hour or 4 or more in 20 minutes after you change your position and drink fluids. · You have a sudden release of fluid from your vagina. · You have low back pain or pelvic pressure that does not go away. · You notice that your baby has stopped moving or is moving much less than normal. 
Watch closely for changes in your health, and be sure to contact your doctor if you have any problems. Where can you learn more? Go to http://dante-rachel.info/. Enter [id number] in the search box to learn more about \"Pregnancy Precautions: Care Instructions. \" Current as of: May 30, 2016 Content Version: 11.1 © 9288-0019 [employer]cad Software. Care instructions adapted under license by [employer] (which disclaims liability or warranty for this information). If you have questions about a medical condition or this instruction, always ask your healthcare professional. [address] any warranty or liability for your use of this information. Counting Your Baby's Kicks: Care Instructions Your Care Instructions Counting your baby's kicks is one way your doctor can tell that your baby is healthy. Most womenespecially in a first pregnancyfeel their baby move for the first time between 16 and 22 weeks. The movement may feel like flutters rather than kicks. Your baby may move more at certain times of the day. When you are active, you may notice less kicking than when you are resting. At your prenatal visits, your doctor will ask whether the baby is active. In your last trimester, your doctor may ask you to count the number of times you feel your baby move. Follow-up care is a key part of your treatment and safety. Be sure to make and go to all appointments, and call your doctor if you are having problems. It's also a good idea to know your test results and keep a list of the medicines you take. How do you count fetal kicks? · A common method of checking your baby's movement is to count the number of kicks or moves you feel in 1 hour.  Ten movements (such as kicks, flutters, or rolls) in 1 hour are normal. Some doctors suggest that you count in the morning until you get to 10 movements. Then you can quit for that day and start again the next day. · Pick your baby's most active time of day to count. This may be any time from morning to evening. · If you do not feel 10 movements in an hour, your baby may be sleeping. Wait for the next hour and count again. When should you call for help? Call your doctor now or seek immediate medical care if: 
· You noticed that your baby has stopped moving or is moving much less than normal. 
Watch closely for changes in your health, and be sure to contact your doctor if you have any problems. Where can you learn more? Go to http://dante-rachel.info/. Enter I869 in the search box to learn more about \"Counting Your Baby's Kicks: Care Instructions. \" Current as of: May 30, 2016 Content Version: 11.1 © 4584-0931 Linea. Care instructions adapted under license by [employer] (which disclaims liability or warranty for this information). If you have questions about a medical condition or this instruction, always ask your healthcare professional. [address] any warranty or liability for your use of this information. Discharge Orders None Introducing [facility]! [facility] introduces OmniForce patient portal. Now you can access parts of your medical record, email your doctor's office, and request medication refills online. 1. In your internet browser, go to [email or web address] 2. Click on the First Time User? Click Here link in the Sign In box. You will see the New Member Sign Up page. 3. Enter your OmniForce Access Code exactly as it appears below. You will not need to use this code after youve completed the sign-up process. If you do not sign up before the expiration date, you must request a new code. · OmniForce Access Code: [email or web address] Expires: 4/15/2017  7:16 PM 
 
 4. Enter the last four digits of your Social Security Number (xxxx) and Date of Birth (mm/dd/yyyy) as indicated and click Submit. You will be taken to the next sign-up page. 5. Create a Takipi ID. This will be your Takipi login ID and cannot be changed, so think of one that is secure and easy to remember. 6. Create a Takipi password. You can change your password at any time. 7. Enter your Password Reset Question and Answer. This can be used at a later time if you forget your password. 8. Enter your e-mail address. You will receive e-mail notification when new information is available in [address] 9. Click Sign Up. You can now view and download portions of your medical record. 10. Click the Download Summary menu link to download a portable copy of your medical information. If you have questions, please visit the Frequently Asked Questions section of the Takipi website. Remember, Takipi is NOT to be used for urgent needs. For medical emergencies, dial 911. Now available from your iPhone and Android! General Information Please provide this summary of care documentation to your next provider. Patient Signature:  ____________________________________________________________ Date:  ____________________________________________________________  
  
Pop Marko Provider Signature:  ____________________________________________________________ Date:  ____________________________________________________________

## 2017-01-28 LAB
Lab: NORMAL
REFERENCE LAB,REFLB: NORMAL
TEST DESCRIPTION:,ATST: NORMAL

## 2017-02-03 ENCOUNTER — ANESTHESIA EVENT (OUTPATIENT)
Dept: LABOR AND DELIVERY | Age: 22
DRG: 560 | End: 2017-02-03
Payer: MEDICAID

## 2017-02-03 ENCOUNTER — HOSPITAL ENCOUNTER (INPATIENT)
Age: 22
LOS: 2 days | Discharge: HOME | DRG: 560 | End: 2017-02-05
Attending: OBSTETRICS & GYNECOLOGY | Admitting: OBSTETRICS & GYNECOLOGY
Payer: MEDICAID

## 2017-02-03 ENCOUNTER — ANESTHESIA (OUTPATIENT)
Dept: LABOR AND DELIVERY | Age: 22
DRG: 560 | End: 2017-02-03
Payer: MEDICAID

## 2017-02-03 PROBLEM — Z34.90 PREGNANCY: Status: ACTIVE | Noted: 2017-02-03

## 2017-02-03 LAB
ALBUMIN SERPL BCP-MCNC: 2.5 G/DL (ref 3.5–5)
ALBUMIN/GLOB SERPL: 0.7 {RATIO} (ref 1.1–2.2)
ALP SERPL-CCNC: 108 U/L (ref 45–117)
ALT SERPL-CCNC: 18 U/L (ref 12–78)
ANION GAP BLD CALC-SCNC: 12 MMOL/L (ref 5–15)
AST SERPL W P-5'-P-CCNC: 18 U/L (ref 15–37)
BASOPHILS # BLD AUTO: 0 K/UL (ref 0–0.1)
BASOPHILS # BLD: 0 % (ref 0–1)
BILIRUB SERPL-MCNC: 0.3 MG/DL (ref 0.2–1)
BUN SERPL-MCNC: 6 MG/DL (ref 6–20)
BUN/CREAT SERPL: 12 (ref 12–20)
CALCIUM SERPL-MCNC: 8.4 MG/DL (ref 8.5–10.1)
CHLORIDE SERPL-SCNC: 100 MMOL/L (ref 97–108)
CO2 SERPL-SCNC: 25 MMOL/L (ref 21–32)
CREAT SERPL-MCNC: 0.52 MG/DL (ref 0.55–1.02)
EOSINOPHIL # BLD: 0.1 K/UL (ref 0–0.4)
EOSINOPHIL NFR BLD: 1 % (ref 0–7)
ERYTHROCYTE [DISTWIDTH] IN BLOOD BY AUTOMATED COUNT: 14.5 % (ref 11.5–14.5)
GLOBULIN SER CALC-MCNC: 3.7 G/DL (ref 2–4)
GLUCOSE SERPL-MCNC: 70 MG/DL (ref 65–100)
HCT VFR BLD AUTO: 34.9 % (ref 35–47)
HGB BLD-MCNC: 11.7 G/DL (ref 11.5–16)
LDH SERPL L TO P-CCNC: 150 U/L (ref 81–246)
LYMPHOCYTES # BLD AUTO: 20 % (ref 12–49)
LYMPHOCYTES # BLD: 1.4 K/UL (ref 0.8–3.5)
MCH RBC QN AUTO: 27.5 PG (ref 26–34)
MCHC RBC AUTO-ENTMCNC: 33.5 G/DL (ref 30–36.5)
MCV RBC AUTO: 81.9 FL (ref 80–99)
MONOCYTES # BLD: 0.7 K/UL (ref 0–1)
MONOCYTES NFR BLD AUTO: 9 % (ref 5–13)
NEUTS SEG # BLD: 5 K/UL (ref 1.8–8)
NEUTS SEG NFR BLD AUTO: 70 % (ref 32–75)
PLATELET # BLD AUTO: 196 K/UL (ref 150–400)
POTASSIUM SERPL-SCNC: 3.4 MMOL/L (ref 3.5–5.1)
PROT SERPL-MCNC: 6.2 G/DL (ref 6.4–8.2)
RBC # BLD AUTO: 4.26 M/UL (ref 3.8–5.2)
SODIUM SERPL-SCNC: 137 MMOL/L (ref 136–145)
URATE SERPL-MCNC: 3.4 MG/DL (ref 2.6–6)
WBC # BLD AUTO: 7.2 K/UL (ref 3.6–11)

## 2017-02-03 PROCEDURE — 74011250636 HC RX REV CODE- 250/636

## 2017-02-03 PROCEDURE — 77030005537 HC CATH URETH BARD -A

## 2017-02-03 PROCEDURE — 77030021125

## 2017-02-03 PROCEDURE — 4A0HXCZ MEASUREMENT OF PRODUCTS OF CONCEPTION, CARDIAC RATE, EXTERNAL APPROACH: ICD-10-PCS | Performed by: OBSTETRICS & GYNECOLOGY

## 2017-02-03 PROCEDURE — 00HU33Z INSERTION OF INFUSION DEVICE INTO SPINAL CANAL, PERCUTANEOUS APPROACH: ICD-10-PCS | Performed by: ANESTHESIOLOGY

## 2017-02-03 PROCEDURE — 10907ZC DRAINAGE OF AMNIOTIC FLUID, THERAPEUTIC FROM PRODUCTS OF CONCEPTION, VIA NATURAL OR ARTIFICIAL OPENING: ICD-10-PCS | Performed by: OBSTETRICS & GYNECOLOGY

## 2017-02-03 PROCEDURE — 84550 ASSAY OF BLOOD/URIC ACID: CPT | Performed by: OBSTETRICS & GYNECOLOGY

## 2017-02-03 PROCEDURE — 65270000029 HC RM PRIVATE

## 2017-02-03 PROCEDURE — 83615 LACTATE (LD) (LDH) ENZYME: CPT | Performed by: OBSTETRICS & GYNECOLOGY

## 2017-02-03 PROCEDURE — 74011250637 HC RX REV CODE- 250/637: Performed by: OBSTETRICS & GYNECOLOGY

## 2017-02-03 PROCEDURE — 3E0R3CZ INTRODUCE REGIONAL ANESTH IN SPINAL CANAL, PERC: ICD-10-PCS | Performed by: ANESTHESIOLOGY

## 2017-02-03 PROCEDURE — 74011250636 HC RX REV CODE- 250/636: Performed by: OBSTETRICS & GYNECOLOGY

## 2017-02-03 PROCEDURE — 3E033VJ INTRODUCTION OF OTHER HORMONE INTO PERIPHERAL VEIN, PERCUTANEOUS APPROACH: ICD-10-PCS | Performed by: OBSTETRICS & GYNECOLOGY

## 2017-02-03 PROCEDURE — 74011000250 HC RX REV CODE- 250

## 2017-02-03 PROCEDURE — 74011000258 HC RX REV CODE- 258: Performed by: OBSTETRICS & GYNECOLOGY

## 2017-02-03 PROCEDURE — 77030014125 HC TY EPDRL BBMI -B: Performed by: ANESTHESIOLOGY

## 2017-02-03 PROCEDURE — 75410000000 HC DELIVERY VAGINAL/SINGLE: Performed by: OBSTETRICS & GYNECOLOGY

## 2017-02-03 PROCEDURE — 76060000078 HC EPIDURAL ANESTHESIA: Performed by: NURSE ANESTHETIST, CERTIFIED REGISTERED

## 2017-02-03 PROCEDURE — 74011250636 HC RX REV CODE- 250/636: Performed by: ANESTHESIOLOGY

## 2017-02-03 PROCEDURE — 80053 COMPREHEN METABOLIC PANEL: CPT | Performed by: OBSTETRICS & GYNECOLOGY

## 2017-02-03 PROCEDURE — 75410000003 HC RECOV DEL/VAG/CSECN EA 0.5 HR: Performed by: OBSTETRICS & GYNECOLOGY

## 2017-02-03 PROCEDURE — 75410000002 HC LABOR FEE PER 1 HR: Performed by: OBSTETRICS & GYNECOLOGY

## 2017-02-03 PROCEDURE — 36415 COLL VENOUS BLD VENIPUNCTURE: CPT | Performed by: OBSTETRICS & GYNECOLOGY

## 2017-02-03 PROCEDURE — 77030018749 HC HK AMNIO DISP DERY -A

## 2017-02-03 PROCEDURE — 85025 COMPLETE CBC W/AUTO DIFF WBC: CPT | Performed by: OBSTETRICS & GYNECOLOGY

## 2017-02-03 RX ORDER — LIDOCAINE HYDROCHLORIDE AND EPINEPHRINE 15; 5 MG/ML; UG/ML
INJECTION, SOLUTION EPIDURAL AS NEEDED
Status: DISCONTINUED | OUTPATIENT
Start: 2017-02-03 | End: 2017-02-03 | Stop reason: HOSPADM

## 2017-02-03 RX ORDER — OXYTOCIN IN 5 % DEXTROSE 30/500 ML
.5-42 PLASTIC BAG, INJECTION (ML) INTRAVENOUS
Status: DISCONTINUED | OUTPATIENT
Start: 2017-02-03 | End: 2017-02-05 | Stop reason: HOSPADM

## 2017-02-03 RX ORDER — NALOXONE HYDROCHLORIDE 0.4 MG/ML
0.4 INJECTION, SOLUTION INTRAMUSCULAR; INTRAVENOUS; SUBCUTANEOUS AS NEEDED
Status: DISCONTINUED | OUTPATIENT
Start: 2017-02-03 | End: 2017-02-03 | Stop reason: HOSPADM

## 2017-02-03 RX ORDER — SODIUM CHLORIDE 0.9 % (FLUSH) 0.9 %
5-10 SYRINGE (ML) INJECTION EVERY 8 HOURS
Status: DISCONTINUED | OUTPATIENT
Start: 2017-02-03 | End: 2017-02-05 | Stop reason: HOSPADM

## 2017-02-03 RX ORDER — OXYCODONE AND ACETAMINOPHEN 5; 325 MG/1; MG/1
1-2 TABLET ORAL
Status: DISCONTINUED | OUTPATIENT
Start: 2017-02-03 | End: 2017-02-03

## 2017-02-03 RX ORDER — OXYCODONE AND ACETAMINOPHEN 5; 325 MG/1; MG/1
2 TABLET ORAL
Status: DISCONTINUED | OUTPATIENT
Start: 2017-02-03 | End: 2017-02-05 | Stop reason: HOSPADM

## 2017-02-03 RX ORDER — SODIUM CHLORIDE 0.9 % (FLUSH) 0.9 %
5-10 SYRINGE (ML) INJECTION AS NEEDED
Status: DISCONTINUED | OUTPATIENT
Start: 2017-02-03 | End: 2017-02-05 | Stop reason: HOSPADM

## 2017-02-03 RX ORDER — IBUPROFEN 800 MG/1
800 TABLET ORAL EVERY 8 HOURS
Status: DISCONTINUED | OUTPATIENT
Start: 2017-02-03 | End: 2017-02-05 | Stop reason: HOSPADM

## 2017-02-03 RX ORDER — SIMETHICONE 80 MG
80 TABLET,CHEWABLE ORAL
Status: DISCONTINUED | OUTPATIENT
Start: 2017-02-03 | End: 2017-02-05 | Stop reason: HOSPADM

## 2017-02-03 RX ORDER — SODIUM CHLORIDE, SODIUM LACTATE, POTASSIUM CHLORIDE, CALCIUM CHLORIDE 600; 310; 30; 20 MG/100ML; MG/100ML; MG/100ML; MG/100ML
125 INJECTION, SOLUTION INTRAVENOUS CONTINUOUS
Status: DISCONTINUED | OUTPATIENT
Start: 2017-02-03 | End: 2017-02-05 | Stop reason: HOSPADM

## 2017-02-03 RX ORDER — HYDROCORTISONE ACETATE PRAMOXINE HCL 2.5; 1 G/100G; G/100G
CREAM TOPICAL AS NEEDED
Status: DISCONTINUED | OUTPATIENT
Start: 2017-02-03 | End: 2017-02-05 | Stop reason: HOSPADM

## 2017-02-03 RX ORDER — BUPIVACAINE HYDROCHLORIDE 2.5 MG/ML
INJECTION, SOLUTION EPIDURAL; INFILTRATION; INTRACAUDAL AS NEEDED
Status: DISCONTINUED | OUTPATIENT
Start: 2017-02-03 | End: 2017-02-03 | Stop reason: HOSPADM

## 2017-02-03 RX ORDER — FENTANYL/BUPIVACAINE/NS/PF 2-1250MCG
1-16 PREFILLED PUMP RESERVOIR EPIDURAL CONTINUOUS
Status: DISCONTINUED | OUTPATIENT
Start: 2017-02-03 | End: 2017-02-05 | Stop reason: HOSPADM

## 2017-02-03 RX ORDER — OXYTOCIN/RINGER'S LACTATE 20/1000 ML
125-500 PLASTIC BAG, INJECTION (ML) INTRAVENOUS ONCE
Status: ACTIVE | OUTPATIENT
Start: 2017-02-03 | End: 2017-02-04

## 2017-02-03 RX ORDER — FENTANYL CITRATE 50 UG/ML
INJECTION, SOLUTION INTRAMUSCULAR; INTRAVENOUS AS NEEDED
Status: DISCONTINUED | OUTPATIENT
Start: 2017-02-03 | End: 2017-02-03 | Stop reason: HOSPADM

## 2017-02-03 RX ORDER — OXYCODONE AND ACETAMINOPHEN 5; 325 MG/1; MG/1
1 TABLET ORAL
Status: DISCONTINUED | OUTPATIENT
Start: 2017-02-03 | End: 2017-02-05 | Stop reason: HOSPADM

## 2017-02-03 RX ORDER — ZOLPIDEM TARTRATE 5 MG/1
5 TABLET ORAL
Status: DISCONTINUED | OUTPATIENT
Start: 2017-02-03 | End: 2017-02-05 | Stop reason: HOSPADM

## 2017-02-03 RX ORDER — NALOXONE HYDROCHLORIDE 0.4 MG/ML
0.4 INJECTION, SOLUTION INTRAMUSCULAR; INTRAVENOUS; SUBCUTANEOUS AS NEEDED
Status: DISCONTINUED | OUTPATIENT
Start: 2017-02-03 | End: 2017-02-05 | Stop reason: HOSPADM

## 2017-02-03 RX ADMIN — SODIUM CHLORIDE, SODIUM LACTATE, POTASSIUM CHLORIDE, AND CALCIUM CHLORIDE 125 ML/HR: 600; 310; 30; 20 INJECTION, SOLUTION INTRAVENOUS at 13:25

## 2017-02-03 RX ADMIN — SODIUM CHLORIDE, SODIUM LACTATE, POTASSIUM CHLORIDE, AND CALCIUM CHLORIDE 125 ML/HR: 600; 310; 30; 20 INJECTION, SOLUTION INTRAVENOUS at 05:50

## 2017-02-03 RX ADMIN — IBUPROFEN 800 MG: 800 TABLET, FILM COATED ORAL at 15:30

## 2017-02-03 RX ADMIN — Medication 2 MILLI-UNITS/MIN: at 07:46

## 2017-02-03 RX ADMIN — FENTANYL CITRATE 25 MCG: 50 INJECTION, SOLUTION INTRAMUSCULAR; INTRAVENOUS at 14:15

## 2017-02-03 RX ADMIN — PENICILLIN G POTASSIUM 2.5 MILLION UNITS: 20000000 POWDER, FOR SOLUTION INTRAVENOUS at 13:58

## 2017-02-03 RX ADMIN — LIDOCAINE HYDROCHLORIDE AND EPINEPHRINE 3.5 ML: 15; 5 INJECTION, SOLUTION EPIDURAL at 14:22

## 2017-02-03 RX ADMIN — FENTANYL 0.2 MG/100ML-BUPIV 0.125%-NACL 0.9% EPIDURAL INJ 10 ML/HR: 2/0.125 SOLUTION at 13:26

## 2017-02-03 RX ADMIN — LIDOCAINE HYDROCHLORIDE AND EPINEPHRINE 3 ML: 15; 5 INJECTION, SOLUTION EPIDURAL at 13:19

## 2017-02-03 RX ADMIN — SODIUM CHLORIDE, SODIUM LACTATE, POTASSIUM CHLORIDE, AND CALCIUM CHLORIDE 125 ML/HR: 600; 310; 30; 20 INJECTION, SOLUTION INTRAVENOUS at 10:14

## 2017-02-03 RX ADMIN — BUPIVACAINE HYDROCHLORIDE 5 ML: 2.5 INJECTION, SOLUTION EPIDURAL; INFILTRATION; INTRACAUDAL at 13:21

## 2017-02-03 RX ADMIN — Medication 10 MILLI-UNITS/MIN: at 10:55

## 2017-02-03 RX ADMIN — PENICILLIN G POTASSIUM 2.5 MILLION UNITS: 20000000 POWDER, FOR SOLUTION INTRAVENOUS at 10:15

## 2017-02-03 RX ADMIN — SODIUM CHLORIDE 5 MILLION UNITS: 900 INJECTION, SOLUTION INTRAVENOUS at 06:12

## 2017-02-03 RX ADMIN — FENTANYL CITRATE 75 MCG: 50 INJECTION, SOLUTION INTRAMUSCULAR; INTRAVENOUS at 14:22

## 2017-02-03 RX ADMIN — BUPIVACAINE HYDROCHLORIDE 3 ML: 2.5 INJECTION, SOLUTION EPIDURAL; INFILTRATION; INTRACAUDAL at 13:25

## 2017-02-03 RX ADMIN — IBUPROFEN 800 MG: 800 TABLET, FILM COATED ORAL at 23:08

## 2017-02-03 NOTE — L&D DELIVERY NOTE
Delivery Summary  Patient: Vickey Zapien             Circumcision:   desires  Additional Delivery Comments - \"Burke\"      Information for the patient's :  Gerry Madrigal, Male [[id number]]       Labor Events:    Labor: No   Rupture Date: 2/3/2017   Rupture Time: 12:41 PM   Rupture Type AROM   Amniotic Fluid Volume: Moderate    Amniotic Fluid Description: Clear     Induction: AROM; Oxytocin       Augmentation: None   Labor Events: None     Cervical Ripening:     None     Delivery Events:  Episiotomy: None   Laceration(s): Other (comment) mario perez   Repaired: None    Number of Repair Packets:     Suture Type and Size: None     Estimated Blood Loss (ml):  ml       Delivery Date: 2/3/2017    Delivery Time: 2:32 PM  Delivery Type: Vaginal, Spontaneous Delivery  Sex:  Male     Gestational Age: 36w4d   Delivery Clinician:  Elayne Harris  Living Status: Yes   Delivery Location: L&D room 203          APGARS  One minute Five minutes Ten minutes   Skin color: 1   1        Heart rate: 2   2        Grimace: 2   2        Muscle tone: 2   2        Breathin   2        Totals: 9   9            Presentation: Vertex    Position: Left Occiput Anterior  Resuscitation Method:  Suctioning-bulb; Tactile Stimulation     Meconium Stained: None      Cord Vessels: 3 Vessels, nuchal x 1    Cord Events:    Cord Blood Sent?:  No    Blood Gases Sent?:  No    Placenta:  Date/Time: 2/3  2:35 PM  Removal: Spontaneous      Appearance: Normal;Intact      Measurements:  Birth Weight: 3.35 kg      Birth Length: 48.9 cm      Head Circumference: 35 cm      Chest Circumference: 32 cm     Abdominal Girth: 32.5 cm    Other Providers:   SUDHAKAR AVILA;SONALI MEREDITH;ANGELICA VALENTE;MARYSOL PADILLA;BRET MOE;SHAN PRADO, Obstetrician;Primary Nurse;Primary [place] Nurse;Staff Nurse;Staff Nurse;Student Nurse;Student Nurse           Cord pH:  none    Episiotomy: None   Laceration(s):  Other (comment)  mario perez Estimated Blood Loss (ml): 300 mL    Labor Events  Method: AROM; Oxytocin       Augmentation: None   Cervical Ripening:       None        Operative Vaginal Delivery - none    Group B Strep:   Lab Results   Component Value Date/Time    GrBStrep, External positive 12/15/2016     Information for the patient's :  Khanh Ahmadi, Male [[id number]]   No results found for: ABORH, PCTABR, PCTDIG, BILI, ABORHEXT, ABORH    No results found for: APH, APCO2, APO2, AHCO3, ABEC, ABDC, O2ST, EPHV, PCO2V, PO2V, HCO3V, EBEV, EBDV, SITE, RSCOM

## 2017-02-03 NOTE — ROUTINE PROCESS
Bedside and Verbal shift change report given to David Mazariegos (oncoming nurse) by Colleen Gonzalez RN (offgoing nurse). Report included the following information SBAR, Kardex, Procedure Summary, Intake/Output, MAR, Accordion and Recent Results.

## 2017-02-03 NOTE — IP AVS SNAPSHOT
Current Discharge Medication List  
  
Take these medications at their scheduled times Dose & Instructions Dispensing Information Comments Morning Noon Evening Bedtime PRENATAL DHA+COMPLETE PRENATAL -300 mg-mcg-mg Cmpk Generic drug:  PNV no.24-iron-folic acid-dha Your next dose is: Today, Tomorrow Other:  ____________ Dose:  1 Tab Take 1 Tab by mouth daily. Refills:  0 Take these medications as needed Dose & Instructions Dispensing Information Comments Morning Noon Evening Bedtime  
 ibuprofen 800 mg tablet Commonly known as:  MOTRIN Your next dose is: Today, Tomorrow Other:  ____________ Dose:  800 mg Take 1 Tab by mouth every eight (8) hours as needed for Pain. Quantity:  30 Tab Refills:  1  
     
   
   
   
  
 oxyCODONE-acetaminophen 5-325 mg per tablet Commonly known as:  PERCOCET Your next dose is: Today, Tomorrow Other:  ____________ Dose:  1-2 Tab Take 1-2 Tabs by mouth every six (6) hours as needed for Pain. Max Daily Amount: 8 Tabs. Quantity:  15 Tab Refills:  0 Where to Get Your Medications Information about where to get these medications is not yet available ! Ask your nurse or doctor about these medications  
  ibuprofen 800 mg tablet  
 oxyCODONE-acetaminophen 5-325 mg per tablet

## 2017-02-03 NOTE — PROGRESS NOTES
Problem: Lactation Care Plan  Goal: *Infant latching appropriately  Outcome: Progressing Towards Goal  Discussed with mother her plan for feeding. Reviewed the benefits of exclusive breast milk feeding during the hospital stay. Informed her of the risks of using formula to supplement in the first few days of life as well as the benefits of successful breast milk feeding; referred her to the Breastfeeding booklet about this information. She acknowledges understanding of information reviewed and states that it is her plan to breast/formula feed her infant. Will support her choice and offer additional information as needed. Hand Expression Education:  Mom taught how to manually hand express her colostrum. Emphasized the importance of providing infant with valuable colostrum as infant rests skin to skin at breast.  Aware to avoid extended periods of non-feeding. Aware to offer 10-20+ drops of colostrum every 2-3 hours until infant is latching and nursing effectively. Taught the rationale behind this low tech but highly effective evidence based practice. Comments:   Pt will successfully establish breastfeeding by feeding in response to early feeding cues   or wake every 3h, will obtain deep latch, and will keep log of feedings/output. Taught to BF at hunger cues and or q 2-3 hrs and to offer 10-20 drops of hand expressed colostrum at any non-feeds.        Breast Assessment  Left Breast: Large, Medium  Left Nipple: Everted, Intact  Right Breast: Large, Medium  Right Nipple: Everted, Intact  Breast- Feeding Assessment  Attends Breast-Feeding Classes: No  Breast-Feeding Experience: Yes (Tried for 1 week with 2 older children)  Type/Quality: Good  Lactation Consultant Visits  Breast-Feedings: Good   Mother/Infant Observation  Mother Observation: Alignment, Breast comfortable, Close hold, Holds breast, Recognizes feeding cues  Infant Observation: Audible swallows, Latches nipple and aereolae, Lips flanged, lower, Lips flanged, upper, Opens mouth, Rhythmic suck  LATCH Documentation  Latch: Grasps breast, tongue down, lips flanged, rhythmic sucking  Audible Swallowing: A few with stimulation  Type of Nipple: Everted (after stimulation)  Comfort (Breast/Nipple): Soft/non-tender  Hold (Positioning): Full assist, teach one side, mother does other, staff holds  [facility] Score: 8

## 2017-02-03 NOTE — ANESTHESIA PROCEDURE NOTES
CSE Block    Start time: 2/3/2017 2:12 PM  End time: 2/3/2017 2:27 PM  Performed by: Ana Contreras  Authorized by: Ana Contreras     Pre-Procedure  Indications: procedure for pain    preanesthetic checklist: patient identified, risks and benefits discussed, anesthesia consent, patient being monitored and timeout performed    Timeout Time: 14:11        Procedure:   Patient Position:  Supine  Prep Region:  Lumbar  Prep: Betadine    Location:  L2-3    Epidural Needle:   Needle Type:  Stout  Needle Gauge:  18 G  Injection Technique:  Loss of resistance using air  Attempts:  1    Spinal Needle:   Needle Type: Petra  Needle Gauge:  27 G    Catheter:   Catheter Type:  Standard  Catheter Size:  20 G  Catheter at Skin Depth (cm):  10  Depth in Epidural Space (cm):  2.5  Events: no blood with aspiration, no cerebrospinal fluid with aspiration, no paresthesia and negative aspiration test    Test Dose:  Lidocaine 1.5% w/ epi and negative    Assessment:   Catheter Secured:  Tegaderm and tape  Insertion:  Uncomplicated  Patient tolerance:  Patient tolerated the procedure well with no immediate complications  Previous epidural removed intact.

## 2017-02-03 NOTE — ROUTINE PROCESS
SBAR IN Report: Mother    Verbal report received from [address] [place] [address] (full name & credentials) on this patient, who is now being transferred from L&D (unit) for routine progression of care. Report consisted of patient's Situation, Background, Assessment and Recommendations (SBAR).  ID bands were compared with the identification form, and verified with the patient and transferring nurse. Information from the SBAR, Kardex, Procedure Summary, Intake/Output, MAR, Accordion and Recent Results and the [place] Report was reviewed with the transferring nurse; opportunity for questions and clarification provided.

## 2017-02-03 NOTE — H&P
EDC:2017  EGA: 38 weeks, 2 days      Primary Provider:  Yamilex Smith MD      History of Present Illness:  Presents for IOL for gestational hypertension. Had a workup for pre-e last week, which was negative. Growth and BPP have been appropriate. Has a favorable cervix. Patient's Prenatal Care with Doctor of Record Yamilex Smith MD Notable For -    *Note: Induction 2/3/17 @ 5:30am @ [facility]  Gestational hypertension  MRSA personal history,, negative 35 week culture  Abnormal glucola 3 hr GTT _NML_  GBS positive RX in labor  UTI pregnant JB  NEG  Abdominal pain pregnant RLQ  Abnormal Ultra-Screen-DSR , low HSTF-G-[id number]-screen neg, f/s   Low MARIETTA A growth _65% @ 28 WGA_   lab screening  Normal pregnancy multigravida  Obesity (BMI > 29.99)          Impression & Recommendations:    Problem # 1:  Gestational hypertension (ICD-642.33) (BMP31-H94.3)  20 yo  at 45 3/7, IOL for GHTN. Favorable cervix, cat 1 tracing. [address] labs this morning, CMP, CBC, Uric acid. Pitocin this am.    Plan AROM after second dose of PCN G. Problem # 2:  GBS positive RX in labor (TPP-313.73) (KAG44-T39.82)  PCN G 5 million units now and then 2.5 million every 4 hours. Problem # 3:  MRSA personal history,, negative 35 week culture (ICD-041.19) (NRT10-Q16.22)  Negative culture recently. Does not need empiric isolation. Other Orders:  Sent to L&D (CPT-AdmitF)      Past Pregnancy History      :  3     Term Births:  2     Premature Births: 0     Living Children: 2     Para:   2     Mult. Births:  0     Prev : 0     Aborta:  0     Elect. Ab:  0     Spont.  Ab:  0     Ectopics:  0    Pregnancy # 1     Delivery date:   10/31/2011     Weeks Gestation: 36      labor:   no     Delivery type:        Hours of labor:   14     Anesthesia type:   epidural     Delivery location:   Other     Infant Sex:  Male     Birth weight:  7.7      Name:  [address] [place] [address],[address]    Pregnancy # 2 Delivery date:   2015     Weeks Gestation: 38.5     Delivery type:        Delivery location:        Infant Sex:  Female     Birth weight:  3.36 kg     Name:  Manny Lund      Comments:  Annel - Apgars 9/9, GBS +    Pregnancy # 3     Comments:  current         Past Medical History:     Reviewed history from 2014 and no changes required:        History of Methicillin Resistant Staphylococcus Aureus (MRSA)     Past Surgical History:     Reviewed history from 2015 and no changes required:        Vaginal x 1         female [id number] Annel (Hospitalist)    Family History Summary:      Reviewed history Last on 2017 and no changes required:2017      General Comments - FH:  Family history transferred to [address] [place] [address] And [address] [place] [address]       Social History:     Reviewed history from 2014 and no changes required:         at [facility]         Stable Relationship       Previous Tobacco Use: Signed On - 2016  Smoked Tobacco Use:  Never smoker     Counseled to quit/cut down:  yes  Passive smoke exposure:  no  Drug use:  no  HIV high-risk behavior:  No  Caffeine use:  0-1 drinks per day    Previous Alcohol Use: Signed On - 2016  Alcohol use:  yes     Drinks per day:  rarely   Exercise:  yes     Times per week:  3     Type of Exercise:  walking   Seatbelt use:  100 %  Sun Exposure:  Rarely    PAP Smear History:     Date of Last PAP Smear:  2016      Review of Systems        See HPI    Except as noted in the HPI, the review of systems is negative for General, Breast, , Resp, GI, Endo, MS, Psych and Heme.     Allergies      Medications Removed from Medication List        Flowsheet View for Follow-up Visit     Estimated weeks of        gestation:  38 2/7     Blood pressure: 130 / 82     FHR:   125      Vital Signs    Blood Pressure: 130 / 82  FHT Descrip:    mod terri       - Additional FHT Comments: accels  Contractions:  no        Physical Exam     General           General appearance: no acute distress    Head           Inspection:   normal    Eyes           External:   EOM intact    ENT           Dental:   adequate dentition    Chest           Lungs:  clear to auscultation          Heart:  regular rate and rhythm    Extremeties           Extremeties:  0 edema    Neurological           Reflexes:  2+ and symmetric with no pathological reflexes    Psych           Orientation:  oriented to time, place, and person          Mood:  no appearance of anxiety, depression, or agitation    Lymph           Inguinal:  no inguinal adenopathy    Skin           Inspection:  no rashes, suspicious lesions, or ulcerations    Abdomen           Abdomen:  gravid    Other Physical Exam Findings           cervical exam deferred until after second dose of PCN so arom can be performed. No contractions or reason to believe she has progressed since her exam earlier this week. Impression & Recommendations:    Problem # 1:  Gestational hypertension (DII-399.47) (WEM18-L77.3)  20 yo  at 45 3/7, IOL for GHTN. Favorable cervix, cat 1 tracing. [address] labs this morning, CMP, CBC, Uric acid. Pitocin this am.    Plan AROM after second dose of PCN G. Problem # 2:  GBS positive RX in labor (RNO-121.29) (ILT84-R41.82)  PCN G 5 million units now and then 2.5 million every 4 hours. Problem # 3:  MRSA personal history,, negative 35 week culture (ICD-041.19) (OKJ88-U49.57)  Negative culture recently. Does not need empiric isolation.       Other Orders:  Sent to L&D (CPT-AdmitF)      Medications (at conclusion of this visit)    2016 AMBIEN 5 MG TABS (ZOLPIDEM TARTRATE) 1 po at bedtime as needed for sleep  06/10/2016 * PNV           LABORATORY DATA   TEST DATE RESULT   Group B Strep culture 10/04/2016 GBS Urine                                   (Group B Strep Culture Result Field)   Blood Type 2016 A                                             (Blood Type Result Field)   Rh 2016 Positive (Rh Result Field)   Rhogam Inj Given 03/07/2015 *   Tdap Vaccine Given 11/29/2016 Vacc. [address]   Antibody Screen 11/29/2016 Negative   Rubella  [facility] Reference Ranges On or After 3/10/14                  <0.90              Non-immune      0.90 - 0.99     Equivocal      >0.99              Immune    [facility] Reference Ranges  Before 3/10/14           <5                 Non-immune             5 - 9               Equivocal            >9                 Immune  [facility] Reference Ranges       < Or = 0.90       Negative             0.91-1.09          Equivocal            > Or = 1.10       Positive   07/06/2016     1.46     TPA (T Pallidum Antibodies) 11/29/2016 Negative   Serology (RPR) 03/07/2015 *   HBsAg 07/06/2016 Negative   HIV 07/06/2016 Non Reactive   Hemoglobin 01/17/2017 11.7   Hematocrit 01/17/2017 35.9   Platelets 82/52/7161 214 X10E3/UL   TSH 03/07/2015 *   Urine Culture 10/04/2016 GBS   GC DNA Probe 07/06/2016 Negative   Chlamydia DNA 07/06/2016 Negative   PAP 07/06/2016 NIL   Flu Vaccine Given 11/03/2016 Vacc.  [address]   HGBA1C 03/07/2015 *   HGB Electro     T4, Free 03/07/2015 *   BG Fasting 12/05/2016 74   GTT 1H 50G 11/29/2016 144   GTT 1H 100G 12/05/2016 111   GTT 2H 100G 12/05/2016 117   GTT 3H 100G 12/05/2016 68   Glucose Plasma 03/07/2015 *   CF Accept or Decline 08/10/2016 Accepted in 2014-neg for 32 mutations   CF Screen Result 10/15/2014 Negative   Nuchal Trans 08/05/2016 2.300 mm   AFP Only 08/26/2016 Declined   Tetra     AFP Serum 03/07/2015 *   CVS 08/10/2016 declined   AFP Amniotic 03/07/2015 *   Amnio Karyo 03/07/2015 *   FISH 03/07/2015 *   GC Culture 03/07/2015 *   Chlamydia Cult 03/07/2015 *   Ureaplasma     Mycoplasma     WBC 01/17/2017 7.2 X10E3/UL   RBC 01/17/2017 4.32 X10E6/UL   MCV 01/17/2017 83   MCH 01/17/2017 27.1   MCHC RBC 01/17/2017 32.6     ULTRASOUND DATA   TEST DATE RESULT   Estimated Fetal Weight 01/27/2017 0213^4753 g&grams Weight % 01/27/2017 72^72% %&percent                                                RAFAL 01/27/2017 7.53^7.5 cm&centimeters                    BPP 01/27/2017 8^8 [n/a]&Not applicable   Cervical Length (mm) 06/10/2016 37.000           Electronically signed by Gretel Harris MD on 02/03/2017 at 7:22 AM    ________________________________________________________________________

## 2017-02-03 NOTE — ANESTHESIA PREPROCEDURE EVALUATION
Anesthetic History   No history of anesthetic complications            Review of Systems / Medical History  Patient summary reviewed and pertinent labs reviewed    Pulmonary  Within defined limits                 Neuro/Psych   Within defined limits           Cardiovascular    Hypertension                   GI/Hepatic/Renal  Within defined limits              Endo/Other        Obesity     Other Findings   Comments: Chronic back pain- radiating down left leg         Physical Exam    Airway  Mallampati: II  TM Distance: 4 - 6 cm  Neck ROM: normal range of motion   Mouth opening: Normal     Cardiovascular    Rhythm: regular  Rate: normal         Dental  No notable dental hx       Pulmonary  Breath sounds clear to auscultation               Abdominal  GI exam deferred       Other Findings            Anesthetic Plan    ASA: 2  Anesthesia type: epidural            Anesthetic plan and risks discussed with: Patient      Late entry - preop done, questions answered, and consent obtained prior to procedure; note entered after procedure at 3:09 PM.

## 2017-02-03 NOTE — PROGRESS NOTES
0225- pt arrived ambulatory for induction of labor, , 38 weeks 3 days gestation. No complaints at this time. Pt stated that she GHTN but no on any medications for it. Pt also stated that she had a history of MRSA in  but, was cleared during this pregnancy at 35 weeks. Consents reviewed and signed. PCN dose started for GBS positive status. 0710-  Bedside and Verbal shift change report given to Daryle Balo, RN (oncoming nurse) by Yoni He RN (offgoing nurse). Report included the following information SBAR, Intake/Output, MAR, Recent Results and Med Rec Status.

## 2017-02-03 NOTE — PROGRESS NOTES
02/03/17 11:50 AM  CM met with patient to complete initial assessment and to begin discharge planning. Patient demographics confirmed. Patient lives with her boyfriend/FOB Antonietta Phillips [phone] and their 2 other children, ages 11 and 3. 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 FOB will have 1 week off from work. There is a support system of family and friends to assist as needed that live close, including patient's sister and mother Julieta Orf [phone]). Patient plans to bottle feed formula and Dr. Yoly Araiza will provide medical follow up for the baby. Patient has car seat, crib, clothing, and other necessary supplies. Patient has Medicaid and is aware of the process to add the baby. Patient does not currently have [address] ; [place] provided information to contact to schedule an appointment. Care Management Interventions  Transition of Care Consult (CM Consult): Discharge Planning  Current Support Network:  Other (Lives with boyfriend/FOB)  Confirm Follow Up Transport: Family  Plan discussed with Pt/Family/Caregiver: Yes  Discharge Location  Discharge Placement: [address] [place] [address]

## 2017-02-03 NOTE — PROGRESS NOTES
26- Bedside SBAR report received from Alvaro Sebastian RN. Assumed care of the patient at this time. Dr. Preston Never in to see the patient, review the strip and discuss the plan of care. Dr. Preston Never requests for pitocin to be started. 18- Dr. Ramón Ortiz in to see the patient, review her strip, check her and discuss the plan of care. [address] BELTRE CRNA in to discuss and place the patients epidural.  Patient sitting on the side of the bed for epidural placement. Attempting to trace the baby during the procedure. 56- Notified Dr. Zulay Frias about the patients epidural not working after infusing for 30 minutes. He stated that he would come see the patient. 80- Dr. Zulay Frias in to evaluate the patient and discuss pain options. Decision made to replace the epidural.    1404- Patient sitting up for new epidural placement with Dr. Zulay Frias. Attempting to trace the baby continuously. 26- Dr. Ramón Ortiz in to see the patient. 1747- Patient transferred to MIU room 302. Patient ambulated to the bathroom and then to the bed without difficulty. Bedside SBAR report given to Lela Rose RN. ID bands verified. Care of the patient turned over at this time.

## 2017-02-04 PROCEDURE — 65270000029 HC RM PRIVATE

## 2017-02-04 PROCEDURE — 74011250637 HC RX REV CODE- 250/637: Performed by: OBSTETRICS & GYNECOLOGY

## 2017-02-04 RX ORDER — DOCUSATE SODIUM 100 MG/1
100 CAPSULE, LIQUID FILLED ORAL 2 TIMES DAILY
Status: DISCONTINUED | OUTPATIENT
Start: 2017-02-04 | End: 2017-02-05 | Stop reason: HOSPADM

## 2017-02-04 RX ADMIN — IBUPROFEN 800 MG: 800 TABLET, FILM COATED ORAL at 14:03

## 2017-02-04 RX ADMIN — DOCUSATE SODIUM 100 MG: 100 CAPSULE ORAL at 20:13

## 2017-02-04 RX ADMIN — IBUPROFEN 800 MG: 800 TABLET, FILM COATED ORAL at 22:23

## 2017-02-04 RX ADMIN — IBUPROFEN 800 MG: 800 TABLET, FILM COATED ORAL at 06:43

## 2017-02-04 NOTE — PROGRESS NOTES
Post-Partum Day Number 1 Progress Note    Christelle Bowels     Assessment: Doing well, post partum day 1    Plan:  1. Continue routine postpartum and perineal care as well as maternal education. 2. Desires OP circumcision, which she also had with her older son. Information for the patient's :  Sathya Cool, Male [[id number]]   Vaginal, Spontaneous Delivery   Patient doing well without significant complaint. Voiding without difficulty, normal lochia. Vitals:  Visit Vitals    /63 (BP 1 Location: Right arm, BP Patient Position: At rest)    Pulse 86    Temp 98.1 °F (36.7 °C)    Resp 18    Ht 5' 8\" (1.727 m)    Wt 107 kg (236 lb)    SpO2 98%    Breastfeeding Unknown    BMI 35.88 kg/m2     Temp (24hrs), Av.2 °F (36.8 °C), Min:97.7 °F (36.5 °C), Max:98.5 °F (36.9 °C)        Exam:   Patient without distress. Abdomen soft, fundus firm, nontender                Perineum with normal lochia noted. Lower extremities are negative for swelling, cords or tenderness.     Labs:     Lab Results   Component Value Date/Time    WBC 7.2 2017 06:02 AM    WBC 7.7 2017 11:00 AM    WBC 9.6 2015 05:00 AM    WBC 7.9 2015 04:20 PM    WBC 13.2 10/31/2011 08:30 AM    WBC 8.0 2009 09:28 PM    HGB 11.7 2017 06:02 AM    HGB 11.2 2017 11:00 AM    HGB 12.8 2015 05:00 AM    HGB 11.7 2015 04:20 PM    HGB 12.1 10/31/2011 08:30 AM    HGB 14.9 2009 09:28 PM    HCT 34.9 2017 06:02 AM    HCT 35.7 2017 11:00 AM    HCT 39.9 2015 05:00 AM    HCT 37.0 2015 04:20 PM    HCT 36.0 10/31/2011 08:30 AM    HCT 43.2 2009 09:28 PM    PLATELET 057  06:02 AM    PLATELET 539  11:00 AM    PLATELET 836  05:00 AM    PLATELET 830 50/15/9405 04:20 PM    PLATELET 663  08:30 AM    PLATELET 352  09:28 PM    Hgb, External 12.8 2014    Hgb, External 13.8 2011    Hct, External 40.0 07/28/2014    Hct, External 40.5 03/30/2011    Platelet cnt., External 187 07/28/2014       Recent Results (from the past 24 hour(s))   METABOLIC PANEL, COMPREHENSIVE    Collection Time: 02/03/17  7:18 AM   Result Value Ref Range    Sodium 137 136 - 145 mmol/L    Potassium 3.4 (L) 3.5 - 5.1 mmol/L    Chloride 100 97 - 108 mmol/L    CO2 25 21 - 32 mmol/L    Anion gap 12 5 - 15 mmol/L    Glucose 70 65 - 100 mg/dL    BUN 6 6 - 20 MG/DL    Creatinine 0.52 (L) 0.55 - 1.02 MG/DL    BUN/Creatinine ratio 12 12 - 20      GFR est AA >60 >60 ml/min/1.73m2    GFR est non-AA >60 >60 ml/min/1.73m2    Calcium 8.4 (L) 8.5 - 10.1 MG/DL    Bilirubin, total 0.3 0.2 - 1.0 MG/DL    ALT (SGPT) 18 12 - 78 U/L    AST (SGOT) 18 15 - 37 U/L    Alk.  phosphatase 108 45 - 117 U/L    Protein, total 6.2 (L) 6.4 - 8.2 g/dL    Albumin 2.5 (L) 3.5 - 5.0 g/dL    Globulin 3.7 2.0 - 4.0 g/dL    A-G Ratio 0.7 (L) 1.1 - 2.2     LD    Collection Time: 02/03/17  7:18 AM   Result Value Ref Range     81 - 246 U/L   URIC ACID    Collection Time: 02/03/17  7:18 AM   Result Value Ref Range    Uric acid 3.4 2.6 - 6.0 MG/DL

## 2017-02-04 NOTE — PROGRESS NOTES
Bedside and Verbal shift change report given to Sally Mccabe RN (oncoming nurse) by Lucius Lawrence RN (offgoing nurse). Report given with SBAR, Kardex, Intake/Output and MAR.

## 2017-02-05 VITALS
RESPIRATION RATE: 18 BRPM | DIASTOLIC BLOOD PRESSURE: 61 MMHG | HEIGHT: 68 IN | OXYGEN SATURATION: 98 % | SYSTOLIC BLOOD PRESSURE: 113 MMHG | BODY MASS INDEX: 35.77 KG/M2 | WEIGHT: 236 LBS | HEART RATE: 75 BPM | TEMPERATURE: 97.4 F

## 2017-02-05 PROCEDURE — 74011250637 HC RX REV CODE- 250/637: Performed by: OBSTETRICS & GYNECOLOGY

## 2017-02-05 RX ORDER — IBUPROFEN 800 MG/1
800 TABLET ORAL
Qty: 30 TAB | Refills: 1 | Status: SHIPPED | OUTPATIENT
Start: 2017-02-05 | End: 2018-11-17

## 2017-02-05 RX ORDER — OXYCODONE AND ACETAMINOPHEN 5; 325 MG/1; MG/1
1-2 TABLET ORAL
Qty: 15 TAB | Refills: 0 | Status: SHIPPED | OUTPATIENT
Start: 2017-02-05 | End: 2018-11-17

## 2017-02-05 RX ADMIN — IBUPROFEN 800 MG: 800 TABLET, FILM COATED ORAL at 06:08

## 2017-02-05 RX ADMIN — DOCUSATE SODIUM 100 MG: 100 CAPSULE ORAL at 10:03

## 2017-02-05 NOTE — PROGRESS NOTES
Post-Partum Day Number 2 Progress Note    Jeny Vivar     Assessment: Doing well, post partum day 2    Plan:   1. Discharge home today  2. Follow up in office in 6 weeks with Shelby Doshi MD  3. Post partum activity advised, diet as tolerated  4. Discharge Medications: ibuprofen, percocet and medications prior to admission    Information for the patient's :  Vicky Guillen, Male [[id number]]   Vaginal, Spontaneous Delivery   Patient doing well without significant complaint. Voiding without difficulty, normal lochia. Vitals:  Visit Vitals    /61 (BP 1 Location: Left arm, BP Patient Position: At rest;Lying right side)    Pulse 75    Temp 97.4 °F (36.3 °C)    Resp 18    Ht 5' 8\" (1.727 m)    Wt 107 kg (236 lb)    SpO2 98%    Breastfeeding Unknown    BMI 35.88 kg/m2     Temp (24hrs), Av.8 °F (36.6 °C), Min:97.4 °F (36.3 °C), Max:98.4 °F (36.9 °C)      Exam:         Patient without distress. Abdomen soft, fundus firm, nontender                 Lower extremities are negative for swelling, cords or tenderness.     Labs:     Lab Results   Component Value Date/Time    WBC 7.2 2017 06:02 AM    WBC 7.7 2017 11:00 AM    WBC 9.6 2015 05:00 AM    WBC 7.9 2015 04:20 PM    WBC 13.2 10/31/2011 08:30 AM    WBC 8.0 2009 09:28 PM    HGB 11.7 2017 06:02 AM    HGB 11.2 2017 11:00 AM    HGB 12.8 2015 05:00 AM    HGB 11.7 2015 04:20 PM    HGB 12.1 10/31/2011 08:30 AM    HGB 14.9 2009 09:28 PM    HCT 34.9 2017 06:02 AM    HCT 35.7 2017 11:00 AM    HCT 39.9 2015 05:00 AM    HCT 37.0 2015 04:20 PM    HCT 36.0 10/31/2011 08:30 AM    HCT 43.2 2009 09:28 PM    PLATELET 659  06:02 AM    PLATELET 990  11:00 AM    PLATELET 113  05:00 AM    PLATELET 445  04:20 PM    PLATELET 105  08:30 AM    PLATELET 454  09:28 PM    Hgb, External 12.8 2014    Hgb, External 13.8 03/30/2011    Hct, External 40.0 07/28/2014    Hct, External 40.5 03/30/2011    Platelet cnt., External 187 07/28/2014       No results found for this or any previous visit (from the past 24 hour(s)).

## 2017-02-05 NOTE — ROUTINE PROCESS
Bedside shift change report given to Fanny Nicole RN (oncoming nurse) by Shan Chau (offgoing nurse). Report included the following information SBAR, Kardex, Intake/Output and MAR.

## 2017-02-05 NOTE — DISCHARGE INSTRUCTIONS
Vaginal Childbirth: After Your Visit  Your Care Instructions  Your body will slowly heal in the next few weeks. It is easy to get too tired and overwhelmed during the first weeks after your baby is born. Changes in your hormones can shift your mood without warning. You may find it hard to meet the extra demands on your energy and time. Take it easy on yourself. Follow-up care is a key part of your treatment and safety. Be sure to make and go to all appointments, and call your doctor if you are having problems. It's also a good idea to know your test results and keep a list of the medicines you take. How can you care for yourself at home? · Vaginal bleeding and cramps  ¨ After delivery, you will have a bloody discharge from the vagina. This will turn pink within a week and then white or yellow after about 10 days. It may last for 2 to 4 weeks or longer, until the uterus has healed. Use pads instead of tampons until you stop bleeding. ¨ Do not worry if you pass some blood clots, as long as they are smaller than a golf ball. If you have a tear or stitches in your vaginal area, change the pad at least every 4 hours to prevent soreness and infection. ¨ You may have cramps for the first few days after childbirth. These are normal and occur as the uterus shrinks to normal size. Take an over-the-counter pain medicine, such as acetaminophen (Tylenol), ibuprofen (Advil, Motrin), or naproxen (Aleve), for cramps. Read and follow all instructions on the label. Do not take aspirin, because it can cause more bleeding. ¨ Do not take two or more pain medicines at the same time unless the doctor told you to. Many pain medicines have acetaminophen, which is Tylenol. Too much acetaminophen (Tylenol) can be harmful. · Stitches  ¨ If you have stitches, they will dissolve on their own and do not need to be removed. Follow your doctor's instructions for cleaning the stitched area.   ¨ Put ice or a cold pack on your painful area for 10 to 20 minutes at a time, several times a day, for the first few days. Put a thin cloth between the ice and your skin. ¨ Sit in a few inches of warm water (sitz bath) 3 times a day and after bowel movements. The warm water helps with pain and itching. If you do not have a tub, a warm shower might help. · Breast fullness  ¨ Your breasts may overfill (engorge) in the first few days after delivery. To help milk flow and to relieve pain, warm your breasts in the shower or by using warm, moist towels before nursing. ¨ If you are not nursing, do not put warmth on your breasts or touch your breasts. Wear a tight bra or sports bra and use ice until the fullness goes away. This usually takes 2 to 3 days. ¨ Put ice or a cold pack on your breast after nursing to reduce swelling and pain. Put a thin cloth between the ice and your skin. · Activity  ¨ Eat a balanced diet. Do not try to lose weight by cutting calories. Keep taking your prenatal vitamins, or take a multivitamin. ¨ Get as much rest as you can. Try to take naps when your baby sleeps during the day. ¨ Get some exercise every day. But do not do any heavy exercise until your doctor says it is okay. ¨ Wait until you are healed (about 4 to 6 weeks) before you have sexual intercourse. Your doctor will tell you when it is okay to have sex. ¨ Talk to your doctor about birth control. You can get pregnant even before your period returns. Also, you can get pregnant while you are breast-feeding. · Mental health  ¨ It is normal to have some sadness, anxiety, sleeplessness, and mood swings after you go home. If you feel upset or hopeless for more than a few days or are having trouble doing the things you need to do, talk to your doctor. · Constipation and hemorrhoids  ¨ Drink plenty of fluids, enough so that your urine is light yellow or clear like water.  If you have kidney, heart, or liver disease and have to limit fluids, talk with your doctor before you increase the amount of fluids you drink. ¨ Eat plenty of fiber each day. Have a bran muffin or bran cereal for breakfast, and try eating a piece of fruit for a mid-afternoon snack. ¨ For painful, itchy hemorrhoids, put ice or a cold pack on the area several times a day for 10 minutes at a time. Follow this by putting a warm compress on the area for another 10 to 20 minutes or by sitting in a shallow, warm bath. When should you call for help? Call 911 anytime you think you may need emergency care. For example, call if:  · You are thinking of hurting yourself, your baby, or anyone else. · You have sudden, severe pain in your belly. · You passed out (lost consciousness). Call your doctor now or seek immediate medical care if:  · You have severe vaginal bleeding. · You are soaking through a pad each hour for 2 or more hours. · Your vaginal bleeding seems to be getting heavier or is still bright red 4 days after delivery. · You are dizzy or lightheaded, or you feel like you may faint. · You are vomiting or cannot keep fluids down. · You have a fever. · You have new or more belly pain. · You pass tissue (not just blood). · Your vaginal discharge smells bad. · Your belly feels tender or full and hard. · Your breasts are continuously painful or red. · You feel sad, anxious, or hopeless for more than a few days. Watch closely for changes in your health, and be sure to contact your doctor if you have any problems. Where can you learn more? Go to [employer]  Enter Q237 in the search box to learn more about \"Vaginal Childbirth: After Your Visit. \"   © 9451-0488 [employer]. Care instructions adapted under license by [employer] (which disclaims liability or warranty for this information).  This care instruction is for use with your licensed healthcare professional. If you have questions about a medical condition or this instruction, always ask your healthcare professional. [employer] disclaims any warranty or liability for your use of this information.   Content Version: 01.3.415422; Current as of: February 20, 2015

## 2017-02-05 NOTE — PROGRESS NOTES
Pt off unit, in stable condition by wheel chair with FOB for discharge home per Dr Lizzeth Bucio. Pt is to follow up in 6 weeks and is aware. Perscriptions given to patient. Patient denies any headache, dizziness, n/v, or pain at this time. Infant in care seat with mother.

## 2017-02-05 NOTE — DISCHARGE SUMMARY
Obstetrical Discharge Summary     Name: Gertrudis Harris MRN: [id number]  SSN: [id number]    YOB: 1995  Age: 19 y.o. Sex: female      Admit Date: 2/3/2017    Discharge Date: 2017     Admitting Physician: Fer Maria MD     Attending Physician:  Fer Maria MD     Admission Diagnoses: Pregnancy    Discharge Diagnoses:   Information for the patient's :  Tasha Orellana, Male [[id number]]   Delivery of a 3.35 kg male infant via Vaginal, Spontaneous Delivery on 2/3/2017 at 2:32 PM  by . Apgars were 9 and 9. Additional Diagnoses:   Hospital Problems  Date Reviewed: 2012          Codes Class Noted POA    Pregnancy ICD-10-CM: Z33.1  ICD-9-CM: V22.2  2/3/2017 Unknown             Lab Results   Component Value Date/Time    Rubella, External immune 2016    GrBStrep, External positive 12/15/2016       Hospital Course: Normal hospital course following the delivery. Disposition at Discharge: Home or self care    Discharged Condition: Stable    Patient Instructions:   Current Discharge Medication List      START taking these medications    Details   ibuprofen (MOTRIN) 800 mg tablet Take 1 Tab by mouth every eight (8) hours as needed for Pain. Qty: 30 Tab, Refills: 1      oxyCODONE-acetaminophen (PERCOCET) 5-325 mg per tablet Take 1-2 Tabs by mouth every six (6) hours as needed for Pain. Max Daily Amount: 8 Tabs. Qty: 15 Tab, Refills: 0         CONTINUE these medications which have NOT CHANGED    Details   PNV no.24-iron-folic acid-dha (PRENATAL DHA+COMPLETE PRENATAL) -300 mg-mcg-mg cmpk Take 1 Tab by mouth daily. Reference my discharge instructions.     Follow-up Appointments   Procedures    FOLLOW UP VISIT Appointment in: 6 Weeks     Standing Status:   Standing     Number of Occurrences:   1     Order Specific Question:   Appointment in     Answer:   6 Weeks        Signed By:  Galen Vila MD     2017

## 2017-02-09 ENCOUNTER — APPOINTMENT (OUTPATIENT)
Dept: GENERAL RADIOLOGY | Age: 22
End: 2017-02-09
Attending: EMERGENCY MEDICINE
Payer: MEDICAID

## 2017-02-09 ENCOUNTER — HOSPITAL ENCOUNTER (EMERGENCY)
Age: 22
Discharge: HOME | End: 2017-02-09
Attending: EMERGENCY MEDICINE | Admitting: EMERGENCY MEDICINE
Payer: MEDICAID

## 2017-02-09 VITALS
HEIGHT: 68 IN | TEMPERATURE: 98.5 F | BODY MASS INDEX: 34.05 KG/M2 | SYSTOLIC BLOOD PRESSURE: 144 MMHG | RESPIRATION RATE: 16 BRPM | WEIGHT: 224.65 LBS | DIASTOLIC BLOOD PRESSURE: 72 MMHG | HEART RATE: 76 BPM | OXYGEN SATURATION: 99 %

## 2017-02-09 DIAGNOSIS — J06.9 ACUTE UPPER RESPIRATORY INFECTION: Primary | ICD-10-CM

## 2017-02-09 DIAGNOSIS — R50.9 FEVER, UNSPECIFIED FEVER CAUSE: ICD-10-CM

## 2017-02-09 LAB
APPEARANCE UR: CLEAR
BACTERIA URNS QL MICRO: NEGATIVE /HPF
BILIRUB UR QL: NEGATIVE
COLOR UR: ABNORMAL
DEPRECATED S PYO AG THROAT QL EIA: NEGATIVE
EPITH CASTS URNS QL MICRO: NORMAL /LPF
FLUAV AG NPH QL IA: NEGATIVE
FLUBV AG NOSE QL IA: NEGATIVE
GLUCOSE UR STRIP.AUTO-MCNC: NEGATIVE MG/DL
HGB UR QL STRIP: ABNORMAL
KETONES UR QL STRIP.AUTO: NEGATIVE MG/DL
LEUKOCYTE ESTERASE UR QL STRIP.AUTO: NEGATIVE
NITRITE UR QL STRIP.AUTO: NEGATIVE
PH UR STRIP: 6 [PH] (ref 5–8)
PROT UR STRIP-MCNC: NEGATIVE MG/DL
RBC #/AREA URNS HPF: NORMAL /HPF (ref 0–5)
SP GR UR REFRACTOMETRY: 1.01 (ref 1–1.03)
UROBILINOGEN UR QL STRIP.AUTO: 0.2 EU/DL (ref 0.2–1)
WBC URNS QL MICRO: NORMAL /HPF (ref 0–4)

## 2017-02-09 PROCEDURE — 81001 URINALYSIS AUTO W/SCOPE: CPT | Performed by: EMERGENCY MEDICINE

## 2017-02-09 PROCEDURE — 99283 EMERGENCY DEPT VISIT LOW MDM: CPT

## 2017-02-09 PROCEDURE — 87070 CULTURE OTHR SPECIMN AEROBIC: CPT | Performed by: EMERGENCY MEDICINE

## 2017-02-09 PROCEDURE — 71020 XR CHEST PA LAT: CPT

## 2017-02-09 PROCEDURE — 87804 INFLUENZA ASSAY W/OPTIC: CPT | Performed by: EMERGENCY MEDICINE

## 2017-02-09 PROCEDURE — 87880 STREP A ASSAY W/OPTIC: CPT | Performed by: EMERGENCY MEDICINE

## 2017-02-10 NOTE — DISCHARGE INSTRUCTIONS
We hope that we have addressed all of your medical concerns. The examination and treatment you received in the Emergency Department were for an emergent problem and were not intended as complete care. It is important that you follow up with your healthcare provider(s) for ongoing care. If your symptoms worsen or do not improve as expected, and you are unable to reach your usual health care provider(s), you should return to the Emergency Department. Today's healthcare is undergoing tremendous change, and patient satisfaction surveys are one of the many tools to assess the quality of medical care. You may receive a survey from the [employer] regarding your experience in the Emergency Department. I hope that your experience has been completely positive, particularly the medical care that I provided. As such, please participate in the survey; anything less than excellent does not meet my expectations or intentions. [address] [facility] and [address] [place] [address] participate in nationally recognized quality of care measures. If your blood pressure is greater than 120/80, as reported below, we urge that you seek medical care to address the potential of high blood pressure, commonly known as hypertension. Hypertension can be hereditary or can be caused by certain medical conditions, pain, stress, or \"white coat syndrome. \"       Please make an appointment with your health care provider(s) for follow up of your Emergency Department visit. VITALS:   Patient Vitals for the past 8 hrs:   Temp Pulse Resp BP SpO2   02/09/17 1918 98.5 °F (36.9 °C) (!) 103 20 156/83 96 %          Thank you for allowing us to provide you with medical care today. We realize that you have many choices for your emergency care needs. Please choose us in the future for any continued health care needs. Storm Peter, [address] Office: [phone]            Recent Results (from the past 24 hour(s))   URINALYSIS W/ RFLX MICROSCOPIC    Collection Time: 02/09/17  7:52 PM   Result Value Ref Range    Color YELLOW/STRAW      Appearance CLEAR CLEAR      Specific gravity 1.010 1.003 - 1.030      pH (UA) 6.0 5.0 - 8.0      Protein NEGATIVE  NEG mg/dL    Glucose NEGATIVE  NEG mg/dL    Ketone NEGATIVE  NEG mg/dL    Bilirubin NEGATIVE  NEG      Blood MODERATE (A) NEG      Urobilinogen 0.2 0.2 - 1.0 EU/dL    Nitrites NEGATIVE  NEG      Leukocyte Esterase NEGATIVE  NEG     INFLUENZA A & B AG (RAPID TEST)    Collection Time: 02/09/17  7:52 PM   Result Value Ref Range    Influenza A Antigen NEGATIVE  NEG      Influenza B Antigen NEGATIVE  NEG     STREP AG SCREEN, GROUP A    Collection Time: 02/09/17  7:52 PM   Result Value Ref Range    Group A Strep Ag ID NEGATIVE  NEG     URINE MICROSCOPIC ONLY    Collection Time: 02/09/17  7:52 PM   Result Value Ref Range    WBC 0-4 0 - 4 /hpf    RBC 0-5 0 - 5 /hpf    Epithelial cells FEW FEW /lpf    Bacteria NEGATIVE  NEG /hpf       Xr Chest Pa Lat    Result Date: 2/9/2017  INDICATION: Post-partum 6 days. fever, cough COMPARISON: None. [facility] FINDINGS: PA and lateral view of the chest. . Lines/tubes/surgical: None. Heart/mediastinum: Unremarkable. Lungs/pleura:  No focal consolidation or mass. No visualized pleural effusion or pneumothorax. Additional Comments: None. Norman Webb IMPRESSION: 1. No radiographic evidence of acute cardiopulmonary disease. Learning About Fever  What is a fever? A fever is a high body temperature. It's one way your body fights being sick. A fever shows that the body is responding to infection or other illnesses, both minor and severe. A fever is a symptom, not an illness by itself. A fever can be a sign that you are ill, but most fevers are not caused by a serious problem. You may have a fever with a minor illness, such as a cold.  But sometimes a very serious infection may cause little or no fever. It is important to look at other symptoms, other conditions you have, and how you feel in general. In children, notice how they act and see what symptoms they complain of. What is a normal body temperature? A normal body temperature is about 98. 6ºF. Some people have a normal temperature that is a little higher or a little lower than this. Your temperature may be a little lower in the morning than it is later in the day. It may go up during hot weather or when you exercise, wear heavy clothes, or take a hot bath. Your temperature may also be different depending on how you take it. A temperature taken in the mouth (oral) or under the arm may be a little lower than your core temperature (rectal). What is a fever temperature? A core temperature of 100.4°F or above is considered a fever. What can cause a fever? A fever may be caused by:  · Infections. This is the most common cause of a fever. Examples of infections that can cause a fever include the flu, a kidney infection, or pneumonia. · Some medicines. · Severe trauma or injury, such as a heart attack, stroke, heatstroke, or burns. · Other medical conditions, such as arthritis and some cancers. How can you treat a fever at home? · Ask your doctor if you can take an over-the-counter pain medicine, such as acetaminophen (Tylenol), ibuprofen (Advil, Motrin), or naproxen (Aleve). Be safe with medicines. Read and follow all instructions on the label. · To prevent dehydration, drink plenty of fluids. Choose water and other caffeine-free clear liquids until you feel better. If you have kidney, heart, or liver disease and have to limit fluids, talk with your doctor before you increase the amount of fluids you drink. Follow-up care is a key part of your treatment and safety. Be sure to make and go to all appointments, and call your doctor if you are having problems.  It's also a good idea to know your test results and keep a list of the medicines you take. Where can you learn more? Go to http://dante-rachel.info/. Enter S063 in the search box to learn more about \"Learning About Fever. \"  Current as of: May 27, 2016  Content Version: 11.1  © 2006-2016 [employer]. Care instructions adapted under license by [employer] (which disclaims liability or warranty for this information). If you have questions about a medical condition or this instruction, always ask your healthcare professional. [address] any warranty or liability for your use of this information. Upper Respiratory Infection (Cold): Care Instructions  Your Care Instructions    An upper respiratory infection, or URI, is an infection of the nose, sinuses, or throat. URIs are spread by coughs, sneezes, and direct contact. The common cold is the most frequent kind of URI. The flu and sinus infections are other kinds of URIs. Almost all URIs are caused by viruses. Antibiotics won't cure them. But you can treat most infections with home care. This may include drinking lots of fluids and taking over-the-counter pain medicine. You will probably feel better in 4 to 10 days. The doctor has checked you carefully, but problems can develop later. If you notice any problems or new symptoms, get medical treatment right away. Follow-up care is a key part of your treatment and safety. Be sure to make and go to all appointments, and call your doctor if you are having problems. It's also a good idea to know your test results and keep a list of the medicines you take. How can you care for yourself at home? · To prevent dehydration, drink plenty of fluids, enough so that your urine is light yellow or clear like water. Choose water and other caffeine-free clear liquids until you feel better.  If you have kidney, heart, or liver disease and have to limit fluids, talk with your doctor before you increase the amount of fluids you drink.  · Take an over-the-counter pain medicine, such as acetaminophen (Tylenol), ibuprofen (Advil, Motrin), or naproxen (Aleve). Read and follow all instructions on the label. · Before you use cough and cold medicines, check the label. These medicines may not be safe for young children or for people with certain health problems. · Be careful when taking over-the-counter cold or flu medicines and Tylenol at the same time. Many of these medicines have acetaminophen, which is Tylenol. Read the labels to make sure that you are not taking more than the recommended dose. Too much acetaminophen (Tylenol) can be harmful. · Get plenty of rest.  · Do not smoke or allow others to smoke around you. If you need help quitting, talk to your doctor about stop-smoking programs and medicines. These can increase your chances of quitting for good. When should you call for help? Call 911 anytime you think you may need emergency care. For example, call if:  · You have severe trouble breathing. Call your doctor now or seek immediate medical care if:  · You seem to be getting much sicker. · You have new or worse trouble breathing. · You have a new or higher fever. · You have a new rash. Watch closely for changes in your health, and be sure to contact your doctor if:  · You have a new symptom, such as a sore throat, an earache, or sinus pain. · You cough more deeply or more often, especially if you notice more mucus or a change in the color of your mucus. · You do not get better as expected. Where can you learn more? Go to http://dante-rachel.info/. Enter D765 in the search box to learn more about \"Upper Respiratory Infection (Cold): Care Instructions. \"  Current as of: June 30, 2016  Content Version: 11.1  © 0714-2848 [employer]. Care instructions adapted under license by [employer] (which disclaims liability or warranty for this information).  If you have questions about a medical condition or this instruction, always ask your healthcare professional. [address] any warranty or liability for your use of this information. Viral Infections: Care Instructions  Your Care Instructions  You don't feel well, but it's not clear what's causing it. You may have a viral infection. Viruses cause many illnesses, such as the common cold, influenza, fever, rashes, and the diarrhea, nausea, and vomiting that are often called \"stomach flu. \" You may wonder if antibiotic medicines could make you feel better. But antibiotics only treat infections caused by bacteria. They don't work on viruses. The good news is that viral infections usually aren't serious. Most will go away in a few days without medical treatment. In the meantime, there are a few things you can do to make yourself more comfortable. Follow-up care is a key part of your treatment and safety. Be sure to make and go to all appointments, and call your doctor if you are having problems. It's also a good idea to know your test results and keep a list of the medicines you take. How can you care for yourself at home? · Get plenty of rest if you feel tired. · Take an over-the-counter pain medicine if needed, such as acetaminophen (Tylenol), ibuprofen (Advil, Motrin), or naproxen (Aleve). Read and follow all instructions on the label. · Be careful when taking over-the-counter cold or flu medicines and Tylenol at the same time. Many of these medicines have acetaminophen, which is Tylenol. Read the labels to make sure that you are not taking more than the recommended dose. Too much acetaminophen (Tylenol) can be harmful. · Drink plenty of fluids, enough so that your urine is light yellow or clear like water. If you have kidney, heart, or liver disease and have to limit fluids, talk with your doctor before you increase the amount of fluids you drink.   · Stay home from work, school, and other public places while you have a fever. When should you call for help? Call 911 anytime you think you may need emergency care. For example, call if:  · You have severe trouble breathing. · You passed out (lost consciousness). Call your doctor now or seek immediate medical care if:  · You seem to be getting much sicker. · You have a new or higher fever. · You have blood in your stools. · You have new belly pain, or your pain gets worse. · You have a new rash. Watch closely for changes in your health, and be sure to contact your doctor if:  · You start to get better and then get worse. · You do not get better as expected. Where can you learn more? Go to http://dante-rachel.info/. Enter Z434 in the search box to learn more about \"Viral Infections: Care Instructions. \"  Current as of: May 24, 2016  Content Version: 11.1  © 9209-8822 [employer]. Care instructions adapted under license by [employer] (which disclaims liability or warranty for this information). If you have questions about a medical condition or this instruction, always ask your healthcare professional. [place] [address] any warranty or liability for your use of this information. Elevated Blood Pressure: Care Instructions  Your Care Instructions    Blood pressure is a measure of how hard the blood pushes against the walls of your arteries. It's normal for blood pressure to go up and down throughout the day. But if it stays up over time, you have high blood pressure. Two numbers tell you your blood pressure. The first number is the systolic pressure. It shows how hard the blood pushes when your heart is pumping. The second number is the diastolic pressure. It shows how hard the blood pushes between heartbeats, when your heart is relaxed and filling with blood. An ideal blood pressure in adults is less than 120/80 (say \"120 over 80\"). High blood pressure is 140/90 or higher.  You have high blood pressure if your top number is 140 or higher or your bottom number is 90 or higher, or both. The main test for high blood pressure is simple, fast, and painless. To diagnose high blood pressure, your doctor will test your blood pressure at different times. You may have to check your blood pressure at home if there is reason to think that the results in the doctor's office aren't accurate. If you are diagnosed with high blood pressure, you can work with your doctor to make a long-term plan to manage it. Follow-up care is a key part of your treatment and safety. Be sure to make and go to all appointments, and call your doctor if you are having problems. It's also a good idea to know your test results and keep a list of the medicines you take. How can you care for yourself at home? · Do not smoke. Smoking increases your risk for heart attack and stroke. If you need help quitting, talk to your doctor about stop-smoking programs and medicines. These can increase your chances of quitting for good. · Stay at a healthy weight. · Try to limit how much sodium you eat to less than 2,300 milligrams (mg) a day. Your doctor may ask you to try to eat less than 1,500 mg a day. · Be physically active. Get at least 30 minutes of exercise on most days of the week. Walking is a good choice. You also may want to do other activities, such as running, swimming, cycling, or playing tennis or team sports. · Avoid or limit alcohol. Talk to your doctor about whether you can drink any alcohol. · Eat plenty of fruits, vegetables, and low-fat dairy products. Eat less saturated and total fats. · Learn how to check your blood pressure at home. When should you call for help? Call your doctor now or seek immediate medical care if:  · Your blood pressure is much higher than normal (such as 180/110 or higher). · You think high blood pressure is causing symptoms such as:  ¨ Severe headache. ¨ Blurry vision.   Watch closely for changes in your health, and be sure to contact your doctor if:  · You do not get better as expected. Where can you learn more? Go to http://dante-rachel.info/. Enter Q954 in the search box to learn more about \"Elevated Blood Pressure: Care Instructions. \"  Current as of: 2016  Content Version: 11.1  © 4277-3704 [employer]. Care instructions adapted under license by [employer] (which disclaims liability or warranty for this information). If you have questions about a medical condition or this instruction, always ask your healthcare professional. [address] any warranty or liability for your use of this information. [employer] Activation    Thank you for requesting access to [employer]. Please follow the instructions below to securely access and download your online medical record. [employer] allows you to send messages to your doctor, view your test results, renew your prescriptions, schedule appointments, and more. How Do I Sign Up? 1. In your internet browser, go to [email or web address]  2. Click on the First Time User? Click Here link in the Sign In box. You will be redirect to the New Member Sign Up page. 3. Enter your [employer] Access Code exactly as it appears below. You will not need to use this code after youve completed the sign-up process. If you do not sign up before the expiration date, you must request a new code. [employer] Access Code: [email or web address]  Expires: 4/15/2017  7:16 PM (This is the date your [employer] access code will )    4. Enter the last four digits of your Social Security Number (xxxx) and Date of Birth (mm/dd/yyyy) as indicated and click Submit. You will be taken to the next sign-up page. 5. Create a [employer] ID. This will be your [employer] login ID and cannot be changed, so think of one that is secure and easy to remember. 6. Create a [employer] password. You can change your password at any time.   7. Enter your Password Reset Question and Answer. This can be used at a later time if you forget your password. 8. Enter your e-mail address. You will receive e-mail notification when new information is available in [address] 9. Click Sign Up. You can now view and download portions of your medical record. 10. Click the Download Summary menu link to download a portable copy of your medical information. Additional Information    If you have questions, please visit the Frequently Asked Questions section of the [employer] website at [email or web address]. [email or web address] Remember, [employer] is NOT to be used for urgent needs. For medical emergencies, dial 911.

## 2017-02-10 NOTE — ED PROVIDER NOTES
HPI Comments: The patient presents to the ED with fever, cough, and sore throat. She is 6 days post-partum s/p vaginal delivery. She was induced for gestational hypertension. She developed subjective fever last night. She has sinus headache/pressure, non-productive cough, rhinorrhea, and sore throat. She took Motrin - last dose at 4 PM. She did have flu vaccine. Her boyfriend has sinusitis, son has strep, and daughter has croup. No wheezing or shortness of breath. She has no nausea or vomiting. No abdominal pain. She is not breast feeding. She has no other concerns. Patient is a 19 y.o. female presenting with fever and cough. The history is provided by the patient. Fever    Associated symptoms include headaches, sore throat and cough. Pertinent negatives include no chest pain, no diarrhea, no vomiting, no congestion, no shortness of breath and no rash. Cough   Associated symptoms include headaches, rhinorrhea and sore throat. Pertinent negatives include no chest pain, no shortness of breath, no nausea and no vomiting. Past Medical History:   Diagnosis Date    Gestational hypertension 2/4/2015    MRSA (methicillin resistant Staphylococcus aureus) infection 2012     pt cleared by screening at 35 weeks in this pregnancy    Skin abscess      grew slight MRSA       History reviewed. No pertinent past surgical history.       Family History:   Problem Relation Age of Onset    Hypertension Father        Social History     Social History    Marital status: SINGLE     Spouse name: N/A    Number of children: N/A    Years of education: N/A     Occupational History     [employer]     Social History Main Topics    Smoking status: Former Smoker     Packs/day: 0.50     Types: Cigarettes     Quit date: 11/14/2012    Smokeless tobacco: Never Used    Alcohol use No    Drug use: No    Sexual activity: Yes     Partners: Male     Birth control/ protection: Condom     Other Topics Concern    Not on file     Social History Narrative         ALLERGIES: Dilaudid [hydromorphone (bulk)]    Review of Systems   Constitutional: Positive for fever. Negative for appetite change. HENT: Positive for rhinorrhea and sore throat. Negative for congestion and nosebleeds. Eyes: Negative for discharge. Respiratory: Positive for cough. Negative for shortness of breath. Cardiovascular: Negative for chest pain. Gastrointestinal: Negative for abdominal pain, diarrhea, nausea and vomiting. Genitourinary: Negative for dysuria. Musculoskeletal: Negative. Skin: Negative for rash. Neurological: Positive for headaches. Negative for weakness. Hematological: Negative for adenopathy. Psychiatric/Behavioral: Negative. All other systems reviewed and are negative. Vitals:    02/09/17 1918 02/09/17 2018   BP: 156/83 144/72   Pulse: (!) 103 76   Resp: 20 16   Temp: 98.5 °F (36.9 °C)    SpO2: 96% 99%   Weight: 101.9 kg (224 lb 10.4 oz)    Height: 5' 8\" (1.727 m)             Physical Exam   Constitutional: She is oriented to person, place, and time. She appears well-developed and well-nourished. HENT:   Head: Normocephalic and atraumatic. Mouth/Throat: Oropharynx is clear and moist.   Eyes: Conjunctivae and EOM are normal. Pupils are equal, round, and reactive to light. Neck: Normal range of motion. Neck supple. Cardiovascular: Normal rate, regular rhythm and normal heart sounds. Pulmonary/Chest: Effort normal and breath sounds normal.   clear   Abdominal: Soft. Bowel sounds are normal. There is no tenderness. Musculoskeletal: Normal range of motion. She exhibits no edema or tenderness. Lymphadenopathy:     She has no cervical adenopathy. Neurological: She is alert and oriented to person, place, and time. No cranial nerve deficit. She exhibits normal muscle tone. Coordination normal.   Skin: Skin is warm and dry. Psychiatric: She has a normal mood and affect.  Her behavior is normal. Nursing note and vitals reviewed. [facility]  ED Course       Procedures    A/P:  1. Fever - subjective. Tylenol and Motrin prn.  2. URI - symptomatic care. 3. Elevated BP - 6 days post-partum. No proteinuria. Advised contacting Ob/gyn in AM to discuss. Patient's results have been reviewed with them. Patient and/or family have verbally conveyed their understanding and agreement of the patient's signs, symptoms, diagnosis, treatment and prognosis and additionally agree to follow up as recommended or return to the Emergency Room should their condition change prior to follow-up. Discharge instructions have also been provided to the patient with some educational information regarding their diagnosis as well a list of reasons why they would want to return to the ER prior to their follow-up appointment should their condition change.

## 2017-02-10 NOTE — ED NOTES
The patient was discharged home by Dr. Krystina Ryan and Yusuf Gardner rn in stable condition, accompanied by family. The patient is alert and oriented, is in no respiratory distress and has vital signs within normal limits . The patient's diagnosis, condition and treatment were explained to patient. The patient expressed understanding. No prescriptions given to pt. No work/school note given to pt. A discharge plan has been developed. A  was not involved in the process. Aftercare instructions were given to the patient. Family will transport pt home.

## 2017-02-11 LAB
BACTERIA SPEC CULT: NORMAL
SERVICE CMNT-IMP: NORMAL

## 2018-04-12 LAB
ANTIBODY SCREEN, EXTERNAL: NEGATIVE
CHLAMYDIA, EXTERNAL: NEGATIVE
HBSAG, EXTERNAL: NORMAL
HIV, EXTERNAL: NEGATIVE
N. GONORRHEA, EXTERNAL: NEGATIVE
N. GONORRHEA, EXTERNAL: NORMAL
RPR, EXTERNAL: NON REACTIVE
RUBELLA, EXTERNAL: NORMAL

## 2018-10-12 LAB — GRBS, EXTERNAL: POSITIVE

## 2018-11-15 ENCOUNTER — HOSPITAL ENCOUNTER (INPATIENT)
Age: 23
LOS: 2 days | Discharge: HOME | DRG: 560 | End: 2018-11-17
Attending: OBSTETRICS & GYNECOLOGY | Admitting: OBSTETRICS & GYNECOLOGY
Payer: MEDICAID

## 2018-11-15 LAB
ERYTHROCYTE [DISTWIDTH] IN BLOOD BY AUTOMATED COUNT: 14.6 % (ref 11.5–14.5)
HCT VFR BLD AUTO: 36.8 % (ref 35–47)
HGB BLD-MCNC: 12.2 G/DL (ref 11.5–16)
MCH RBC QN AUTO: 28.2 PG (ref 26–34)
MCHC RBC AUTO-ENTMCNC: 33.2 G/DL (ref 30–36.5)
MCV RBC AUTO: 85 FL (ref 80–99)
NRBC # BLD: 0 K/UL (ref 0–0.01)
NRBC BLD-RTO: 0 PER 100 WBC
PLATELET # BLD AUTO: 226 K/UL (ref 150–400)
PMV BLD AUTO: 10.1 FL (ref 8.9–12.9)
RBC # BLD AUTO: 4.33 M/UL (ref 3.8–5.2)
WBC # BLD AUTO: 10.7 K/UL (ref 3.6–11)

## 2018-11-15 PROCEDURE — 36415 COLL VENOUS BLD VENIPUNCTURE: CPT

## 2018-11-15 PROCEDURE — 10907ZC DRAINAGE OF AMNIOTIC FLUID, THERAPEUTIC FROM PRODUCTS OF CONCEPTION, VIA NATURAL OR ARTIFICIAL OPENING: ICD-10-PCS | Performed by: OBSTETRICS & GYNECOLOGY

## 2018-11-15 PROCEDURE — 75410000000 HC DELIVERY VAGINAL/SINGLE: Performed by: OBSTETRICS & GYNECOLOGY

## 2018-11-15 PROCEDURE — 74011250636 HC RX REV CODE- 250/636

## 2018-11-15 PROCEDURE — 75410000002 HC LABOR FEE PER 1 HR: Performed by: OBSTETRICS & GYNECOLOGY

## 2018-11-15 PROCEDURE — 74011250636 HC RX REV CODE- 250/636: Performed by: OBSTETRICS & GYNECOLOGY

## 2018-11-15 PROCEDURE — 75410000003 HC RECOV DEL/VAG/CSECN EA 0.5 HR: Performed by: OBSTETRICS & GYNECOLOGY

## 2018-11-15 PROCEDURE — 85027 COMPLETE CBC AUTOMATED: CPT

## 2018-11-15 PROCEDURE — 65270000029 HC RM PRIVATE

## 2018-11-15 RX ORDER — PENICILLIN G POTASSIUM 5000000 [IU]/1
INJECTION, POWDER, FOR SOLUTION INTRAMUSCULAR; INTRAVENOUS
Status: DISCONTINUED
Start: 2018-11-15 | End: 2018-11-15

## 2018-11-15 RX ORDER — HYDROCORTISONE ACETATE PRAMOXINE HCL 2.5; 1 G/100G; G/100G
CREAM TOPICAL AS NEEDED
Status: DISCONTINUED | OUTPATIENT
Start: 2018-11-15 | End: 2018-11-17 | Stop reason: HOSPADM

## 2018-11-15 RX ORDER — ZOLPIDEM TARTRATE 5 MG/1
5 TABLET ORAL
Status: DISCONTINUED | OUTPATIENT
Start: 2018-11-15 | End: 2018-11-17 | Stop reason: HOSPADM

## 2018-11-15 RX ORDER — IBUPROFEN 800 MG/1
800 TABLET ORAL EVERY 8 HOURS
Status: DISCONTINUED | OUTPATIENT
Start: 2018-11-15 | End: 2018-11-17 | Stop reason: HOSPADM

## 2018-11-15 RX ORDER — OXYTOCIN/0.9 % SODIUM CHLORIDE 30/500 ML
PLASTIC BAG, INJECTION (ML) INTRAVENOUS
Status: COMPLETED
Start: 2018-11-15 | End: 2018-11-15

## 2018-11-15 RX ORDER — OXYTOCIN/0.9 % SODIUM CHLORIDE 30/500 ML
0-20 PLASTIC BAG, INJECTION (ML) INTRAVENOUS
Status: DISCONTINUED | OUTPATIENT
Start: 2018-11-15 | End: 2018-11-15

## 2018-11-15 RX ORDER — SODIUM CHLORIDE 900 MG/100ML
INJECTION INTRAVENOUS
Status: DISCONTINUED
Start: 2018-11-15 | End: 2018-11-15

## 2018-11-15 RX ORDER — ACETAMINOPHEN 325 MG/1
650 TABLET ORAL
Status: DISCONTINUED | OUTPATIENT
Start: 2018-11-15 | End: 2018-11-15

## 2018-11-15 RX ORDER — SODIUM CHLORIDE, SODIUM LACTATE, POTASSIUM CHLORIDE, CALCIUM CHLORIDE 600; 310; 30; 20 MG/100ML; MG/100ML; MG/100ML; MG/100ML
125 INJECTION, SOLUTION INTRAVENOUS CONTINUOUS
Status: DISCONTINUED | OUTPATIENT
Start: 2018-11-15 | End: 2018-11-17 | Stop reason: HOSPADM

## 2018-11-15 RX ORDER — ACETAMINOPHEN 325 MG/1
650 TABLET ORAL
Status: DISCONTINUED | OUTPATIENT
Start: 2018-11-15 | End: 2018-11-17 | Stop reason: HOSPADM

## 2018-11-15 RX ORDER — ONDANSETRON 2 MG/ML
4 INJECTION INTRAMUSCULAR; INTRAVENOUS
Status: DISCONTINUED | OUTPATIENT
Start: 2018-11-15 | End: 2018-11-17 | Stop reason: HOSPADM

## 2018-11-15 RX ORDER — ONDANSETRON 2 MG/ML
4 INJECTION INTRAMUSCULAR; INTRAVENOUS
Status: DISCONTINUED | OUTPATIENT
Start: 2018-11-15 | End: 2018-11-15

## 2018-11-15 RX ORDER — OXYTOCIN/RINGER'S LACTATE 20/1000 ML
125-500 PLASTIC BAG, INJECTION (ML) INTRAVENOUS ONCE
Status: ACTIVE | OUTPATIENT
Start: 2018-11-15 | End: 2018-11-16

## 2018-11-15 RX ORDER — NALOXONE HYDROCHLORIDE 0.4 MG/ML
0.4 INJECTION, SOLUTION INTRAMUSCULAR; INTRAVENOUS; SUBCUTANEOUS AS NEEDED
Status: DISCONTINUED | OUTPATIENT
Start: 2018-11-15 | End: 2018-11-17 | Stop reason: HOSPADM

## 2018-11-15 RX ORDER — NALOXONE HYDROCHLORIDE 0.4 MG/ML
0.4 INJECTION, SOLUTION INTRAMUSCULAR; INTRAVENOUS; SUBCUTANEOUS AS NEEDED
Status: DISCONTINUED | OUTPATIENT
Start: 2018-11-15 | End: 2018-11-15 | Stop reason: SDUPTHER

## 2018-11-15 RX ORDER — OXYTOCIN IN 5 % DEXTROSE 30/500 ML
1 PLASTIC BAG, INJECTION (ML) INTRAVENOUS
Status: DISCONTINUED | OUTPATIENT
Start: 2018-11-15 | End: 2018-11-15

## 2018-11-15 RX ADMIN — OXYTOCIN-SODIUM CHLORIDE 0.9% IV SOLN 30 UNIT/500ML 30000 MILLI-UNITS: 30-0.9/5 SOLUTION at 12:58

## 2018-11-15 RX ADMIN — PENICILLIN G POTASSIUM 2.5 MILLION UNITS: 20000000 POWDER, FOR SOLUTION INTRAVENOUS at 15:44

## 2018-11-15 RX ADMIN — OXYTOCIN-SODIUM CHLORIDE 0.9% IV SOLN 30 UNIT/500ML 2 MILLI-UNITS/MIN: 30-0.9/5 SOLUTION at 13:00

## 2018-11-15 NOTE — L&D DELIVERY NOTE
Delivery Summary  Patient: Abhi Ramey             Circumcision:   NA-female  Additional Delivery Comments - Labored quickly to c/c/+1, pushed x 1 and achieved delivery of fetal head in MYRTLE and anterior shoulder followed swiftly. Body then delivered with ease and baby placed on abdomen. After delay, cord clamped and cut and placenta delivered intact. No tears noted      Information for the patient's :  Hernandez Grade, Female [[id number]]       Labor Events:    Labor: No   Rupture Date: 11/15/2018   Rupture Time: 3:40 PM   Rupture Type AROM   Amniotic Fluid Volume:      Amniotic Fluid Description: Clear None   Induction: Oxytocin       Augmentation: None   Labor Events: None     Cervical Ripenin/15/2018 12:48 PM None     Delivery Events:  Episiotomy: None   Laceration(s): None     Repaired: None    Number of Repair Packets:     Suture Type and Size: None     Estimated Blood Loss (ml):  ml       Delivery Date: 11/15/2018    Delivery Time: 5:02 PM  Delivery Type:    Sex:  Female     Gestational Age: 39w4d   Delivery Clinician:  Jo Calles  Living Status: Living   Delivery Location: L&D            APGARS  One minute Five minutes Ten minutes   Skin color: 1   1        Heart rate: 2   2        Grimace: 2   2        Muscle tone: 2   2        Breathin   2        Totals: 9   9            Presentation: Vertex    Position: Left Occiput Anterior  Resuscitation Method:  Tactile Stimulation;Suctioning-bulb     Meconium Stained: None      Cord Information: 3 Vessels  Complications: None  Cord around:    Delayed cord clamping? Yes  Cord clamped date/time:   Disposition of Cord Blood:      Blood Gases Sent?: No    Placenta:  Date/Time: 11/15/2018  5:07 PM  Removal: Spontaneous      Appearance: Normal      Measurements:  Birth Weight:        Birth Length:        Head Circumference:        Chest Circumference:       Abdominal Girth:       Other Providers:   Ramesh Stock R, Obstetrician;Primary Nurse;Primary  Nurse           Cord pH:  none    Episiotomy: None   Laceration(s): None     Estimated Blood Loss (ml): 200    Labor Events  Method: Oxytocin      Augmentation: None   Cervical Ripenin/15/2018  12:48 PM  None        Hospital Problems  Date Reviewed: 2012          Codes Class Noted POA    Normal labor and delivery ICD-10-CM: O80  ICD-9-CM: 028  3/7/2015 Unknown              Operative Vaginal Delivery - none    Group B Strep:   Lab Results   Component Value Date/Time    GrBStrep, External positive 10/12/2018     Information for the patient's :  Hiral Larsen, Female [[id number]]   No results found for: ABORH, PCTABR, PCTDIG, BILI, ABORHEXT, ABORH    No results found for: APH, APCO2, APO2, AHCO3, ABEC, ABDC, O2ST, EPHV, PCO2V, PO2V, HCO3V, EBEV, EBDV, SITE, RSCOM

## 2018-11-15 NOTE — H&P
Obstetrics Admission History & Physical 
 
Name: Mack Soler MRN: [id number]  SSN: [id number] YOB: 1995  Age: 20 y.o. Sex: female Subjective:  
 
Reason for Admission:  Pregnancy and early labor History of Present Illness: Mack Soler is a 20 y.o.  female with an estimated gestational age of 43w3d with Estimated Date of Delivery: 18. Patient complains of mild contractions for 1 days. Pregnancy has been complicated by group B strep carrier and MRSA carrier. Patient denies abdominal pain  , chest pain, nausea and vomiting, shortness of breath, vaginal bleeding , vaginal leaking of fluid  and visual disturbances. OB History  Para Term  AB Living 4 3 2     2 SAB TAB Ectopic Molar Multiple Live Births  
         0 2 Past Medical History:  
Diagnosis Date  Gestational hypertension 2015  MRSA (methicillin resistant Staphylococcus aureus) infection 2012  
 pt cleared by screening at 35 weeks in this pregnancy  Skin abscess   
 grew slight MRSA No past surgical history on file. Social History Socioeconomic History  Marital status: SINGLE Spouse name: Not on file  Number of children: Not on file  Years of education: Not on file  Highest education level: Not on file Social Needs  Financial resource strain: Not very hard  Food insecurity - worry: Not on file  Food insecurity - inability: Not on file  Transportation needs - medical: Not on file  Transportation needs - non-medical: Not on file Occupational History Employer: [address] Tobacco Use  Smoking status: Former Smoker Packs/day: 0.50 Types: Cigarettes Last attempt to quit: 2012 Years since quittin.0  Smokeless tobacco: Never Used Substance and Sexual Activity  Alcohol use: No  
 Drug use: No  
 Sexual activity: Yes  
  Partners: Male Birth control/protection: Condom Other Topics Concern [address] Service Not Asked  Blood Transfusions Not Asked  Caffeine Concern Not Asked  Occupational Exposure Not Asked [place] Hazards Not Asked  Sleep Concern Not Asked  Stress Concern Not Asked  Weight Concern Not Asked  Special Diet Not Asked  Back Care Not Asked  Exercise Not Asked  Bike Helmet Not Asked  Seat Belt Not Asked  Self-Exams Not Asked Social History Narrative  Not on file Family History Problem Relation Age of Onset  Hypertension Father Allergies Allergen Reactions  Dilaudid [Hydromorphone (Bulk)] Rash Prior to Admission medications Medication Sig Start Date End Date Taking? Authorizing Provider PNV no.24-iron-folic acid-dha (PRENATAL DHA+COMPLETE PRENATAL) -300 mg-mcg-mg cmpk Take 1 Tab by mouth daily. Yes Provider, Historical  
ibuprofen (MOTRIN) 800 mg tablet Take 1 Tab by mouth every eight (8) hours as needed for Pain. 17   Holley Johnson MD  
oxyCODONE-acetaminophen (PERCOCET) 5-325 mg per tablet Take 1-2 Tabs by mouth every six (6) hours as needed for Pain. Max Daily Amount: 8 Tabs. 17   Holley Johnson MD  
  
 
Review of Systems: A comprehensive review of systems was negative except for that written in the History of Present Illness. Objective:  
 
Vitals:  Temperature 97.6 °F (36.4 °C), resp. rate 16, height 5' 8\" (1.727 m), weight 114.8 kg (253 lb), last menstrual period 2018, currently breastfeeding. Temp (24hrs), Av.6 °F (36.4 °C), Min:97.6 °F (36.4 °C), Max:97.6 °F (36.4 °C) No Data Recorded Physical Exam: 
Patient without distress. Fundus: soft and non tender Perineum: blood absent, amniotic fluid absent Cervical Exam: 4 cm dilated 50% effaced   
-2 station Lower Extremities:  - Edema 1+ 
 - Patellar Reflexes: 1+ bilaterally - Clonus: absent Membranes:  Intact Uterine Activity:  None Fetal Heart Rate:  Reactive, 130's moderate variability with accelerations no decelerations Labs:  
Recent Results (from the past 24 hour(s)) CBC W/O DIFF Collection Time: 11/15/18 11:25 AM  
Result Value Ref Range WBC 10.7 3.6 - 11.0 K/uL  
 RBC 4.33 3.80 - 5.20 M/uL  
 HGB 12.2 11.5 - 16.0 g/dL HCT 36.8 35.0 - 47.0 % MCV 85.0 80.0 - 99.0 FL  
 MCH 28.2 26.0 - 34.0 PG  
 MCHC 33.2 30.0 - 36.5 g/dL  
 RDW 14.6 (H) 11.5 - 14.5 % PLATELET 961 635 - 492 K/uL MPV 10.1 8.9 - 12.9 FL  
 NRBC 0.0 0  WBC ABSOLUTE NRBC 0.00 0.00 - 0.01 K/uL Assessment and Plan:  
 
21 yo  @ 44 WGA with early labor and GBS + status Desires augmentation PCN to be given Continuous monitoring Augment with IOL and AROM as indicated per PCN coverage Epidural not desired but available to patient Consents and labs obtained Signed By:  Ca Mensah MD   
 November 15, 2018

## 2018-11-15 NOTE — PROGRESS NOTES
1111- Pt arrived to the unit from office. Pt followed in prenatal care by Dr. Servando Eric. Pt a  at 37 weeks 4 days. Pt denies leaking of fluid or vaginal bleeding. Pt denies complications of pregnancy. 200- Dr. Servando Eric at bedside. Bedside ultrasound performed, vertex presentation confirmed. 1540- AROM, moderate amount of clear fluid. 56- Notified Dr. Servando Eric of patient status and most recent SVE 
 
165- Dr. Servando Eric in room for delivery 165- Pt up in [facility] 1700- Pt educated re: pushing 
 
 1702-  of live female infant by Dr. Servando Eric. Apgars 9/9. . Infant placed skin to skin with mother. 170- Placenta delivery. Pitocin infusing [address] report given to [facility] Alena Morales, VIOLA from Garrett Larson [facility]

## 2018-11-15 NOTE — PROGRESS NOTES
Patient seen for interval exam 
2nd dose of pcn infusing Some pain with contractions No LOF no VB Visit Vitals /81 Pulse 83 Temp 98 °F (36.7 °C) Resp 16 Ht 5' 8\" (1.727 m) Wt 114.8 kg (253 lb) LMP 2018 Breastfeeding? Yes  
BMI 38.47 kg/m² Patient Vitals for the past 4 hrs: Mode Fetal Heart Rate Fetal Activity Variability Decelerations Accelerations RN Reviewed Strip? Provider who reviewed strip? Non Stress Test  
11/15/18 1508 External 135 Present 6-25 BPM None Yes Yes    
11/15/18 1500 External 135 Present 6-25 BPM None Yes Yes    
11/15/18 1430 External 135 Present 6-25 BPM None Yes Yes    
11/15/18 1415 External 135  6-25 BPM None Yes Yes    
11/15/18 1400 External 135 Present 6-25 BPM None Yes Yes    
11/15/18 1330 External 130 Present 6-25 BPM None Yes Yes    
11/15/18 1315 External 130     Yes    
11/15/18 1300 External 130  6-25 BPM None Yes Yes    
11/15/18 1230 External 125 Present 6-25 BPM None Yes Yes Dr Donna Tejeda 11/15/18 1200 External 130  6-25 BPM       
 CAT I Cervical Exam 
Dilation (cm): 5(by JOHN Eaton) Eff: 70 % Station: -1 Membrane Status: AROM Continue with augmentation, expect

## 2018-11-16 PROCEDURE — 74011250637 HC RX REV CODE- 250/637: Performed by: OBSTETRICS & GYNECOLOGY

## 2018-11-16 PROCEDURE — 65270000029 HC RM PRIVATE

## 2018-11-16 RX ADMIN — IBUPROFEN 800 MG: 800 TABLET ORAL at 04:26

## 2018-11-16 NOTE — PROGRESS NOTES
Post-Partum Day Number 1 Progress Note London Raphael Assessment: Doing well, post partum day 1 Plan: 1. Continue routine postpartum and perineal care as well as maternal education. Information for the patient's :  Hasmukh De Leon, Female [[id number]] Patient doing well without significant complaint. Voiding without difficulty, normal lochia. Vitals: 
Visit Vitals /62 (BP 1 Location: Right arm, BP Patient Position: Sitting) Pulse 76 Temp 97.9 °F (36.6 °C) Resp 16 Ht 5' 8\" (1.727 m) Wt 114.8 kg (253 lb) LMP 2018 Breastfeeding? Yes  
BMI 38.47 kg/m² Temp (24hrs), Av.9 °F (36.6 °C), Min:97.6 °F (36.4 °C), Max:98.2 °F (36.8 °C) Exam:   Patient without distress. Abdomen soft, fundus firm, nontender Perineum with normal lochia noted. Lower extremities are negative for swelling, cords or tenderness. Labs:  
 
Lab Results Component Value Date/Time WBC 10.7 11/15/2018 11:25 AM  
 WBC 7.2 2017 06:02 AM  
 WBC 7.7 2017 11:00 AM  
 WBC 9.6 2015 05:00 AM  
 WBC 7.9 2015 04:20 PM  
 WBC 13.2 (H) 10/31/2011 08:30 AM  
 WBC 8.0 2009 09:28 PM  
 HGB 12.2 11/15/2018 11:25 AM  
 HGB 11.7 2017 06:02 AM  
 HGB 11.2 (L) 2017 11:00 AM  
 HGB 12.8 2015 05:00 AM  
 HGB 11.7 2015 04:20 PM  
 HGB 12.1 10/31/2011 08:30 AM  
 HGB 14.9 2009 09:28 PM  
 HCT 36.8 11/15/2018 11:25 AM  
 HCT 34.9 (L) 2017 06:02 AM  
 HCT 35.7 2017 11:00 AM  
 HCT 39.9 2015 05:00 AM  
 HCT 37.0 2015 04:20 PM  
 HCT 36.0 10/31/2011 08:30 AM  
 HCT 43.2 2009 09:28 PM  
 PLATELET 174  11:25 AM  
 PLATELET 635  06:02 AM  
 PLATELET 761  11:00 AM  
 PLATELET 021  05:00 AM  
 PLATELET 723  04:20 PM  
 PLATELET 839  08:30 AM  
 PLATELET 888  09:28 PM  
 Hgb, External 12.8 2014 Hgb, External 13.8 03/30/2011 Hct, External 40.0 07/28/2014 Hct, External 40.5 03/30/2011 Platelet cnt., External 187 07/28/2014 Recent Results (from the past 24 hour(s)) CBC W/O DIFF Collection Time: 11/15/18 11:25 AM  
Result Value Ref Range WBC 10.7 3.6 - 11.0 K/uL  
 RBC 4.33 3.80 - 5.20 M/uL  
 HGB 12.2 11.5 - 16.0 g/dL HCT 36.8 35.0 - 47.0 % MCV 85.0 80.0 - 99.0 FL  
 MCH 28.2 26.0 - 34.0 PG  
 MCHC 33.2 30.0 - 36.5 g/dL  
 RDW 14.6 (H) 11.5 - 14.5 % PLATELET 381 551 - 189 K/uL MPV 10.1 8.9 - 12.9 FL  
 NRBC 0.0 0  WBC ABSOLUTE NRBC 0.00 0.00 - 0.01 K/uL

## 2018-11-16 NOTE — PROGRESS NOTES
Bedside and Verbal shift change report given to Dalton Antonio RN (oncoming nurse) by Dawit Paulino RN (offgoing nurse). Report included the following information SBAR, Kardex, Procedure Summary, Intake/Output, MAR and Recent Results.

## 2018-11-16 NOTE — PROGRESS NOTES
Bedside shift change report given to José Miguel Nunez RN (oncoming nurse) by Kim Stroud RN (offgoing nurse). Report included the following information SBAR, Kardex, Intake/Output and MAR.

## 2018-11-16 NOTE — LACTATION NOTE
This note was copied from a baby's chart. Mother BF baby in football position. Baby double swaddled with 2 hats. Baby asleep latched to breast.  Assisted mother with waking baby, positioning, and latching at breast.  Baby latched deeply in side-lying position, rhythmic sucking noted with repeated audible swallows. BF basics reviewed with parents. This is baby #4 however mother has never BF prior. Mother states family hx of tongue and lip ties. Baby assessed prior to BF. Baby able to easily extend tongue out of mouth, lips move freely. Discussed with mother her plan for feeding. Reviewed the benefits of exclusive breast milk feeding during the hospital stay. Informed her of the risks of using formula to supplement in the first few days of life as well as the benefits of successful breast milk feeding; referred her to the Breastfeeding booklet about this information. She acknowledges understanding of information reviewed and states that it is her plan to breastfeed her infant. Will support her choice and offer additional information as needed. Reviewed breastfeeding basics:  How milk is made and normal  breastfeeding behaviors discussed. Supply and demand,  stomach size, early feeding cues, skin to skin bonding with comfortable positioning and baby led latch-on reviewed. How to identify signs of successful breastfeeding sessions reviewed; education on assymetrical latch, signs of effective latching vs shallow, in-effective latching, normal  feeding frequency and duration and expected infant output discussed. Normal course of breastfeeding discussed including the AAP's recommendation that children receive exclusive breast milk feedings for the first six months of life with breast milk feedings to continue through the first year of life and/or beyond as complimentary table foods are added.   Breastfeeding Booklet and Warm line information provided with discussion. Discussed typical  weight loss and the importance of pediatrician appointment within 24-48 hours of discharge, at 2 weeks of life and normalcy of requesting pediatric weight checks as needed in between visits. Discussed with mother the benefits of breastfeeding for both mom and baby. Discussed with mom that we encourage exclusive breastfeeding for the first 6 months and that it is recommended to continue to breastfeed for a minimum of one year. Research shows that breastfeeding offers an unmatched beginning for our children. Providing infants with human milk gives them the most complete nutrition possible. Human milk provides the perfect mix of nutrients and antibodies needed for babies to thrive. Scientific studies show that breast fed children have fewer and less serious illnesses then those who never receive breast milk. Mothers that breastfeed are healthier and also enjoy emotional benefits of the very special and close relationship formed through breastfeeding. Breastfeeding is economical and allows families to save money. Communities also benefit when mothers breastfeed because each breast fed baby cuts down on our pollution. Hand Expression Education:  Mom taught how to manually hand express her colostrum. Emphasized the importance of providing infant with valuable colostrum as infant rests skin to skin at breast.  Aware to avoid extended periods of non-feeding. Aware to offer 10-20+ drops of colostrum every 2-3 hours until infant is latching and nursing effectively. Taught the rationale behind this low tech but highly effective evidence based practice. Pt will successfully establish breastfeeding by feeding in response to early feeding cues  
or wake every 3h, will obtain deep latch, and will keep log of feedings/output. Taught to BF at hunger cues and or q 2-3 hrs and to offer 10-20 drops of hand expressed colostrum at any non-feeds. Breast Assessment Left Breast: Large Left Nipple: Everted, Intact Right Breast: Large Right Nipple: Everted, Intact Breast- Feeding Assessment Attends Breast-Feeding Classes: No 
Breast-Feeding Experience: No 
Breast Trauma/Surgery: No 
Type/Quality: Good Lactation Consultant Visits Breast-Feedings: Good Mother/Infant Observation Mother Observation: Breast comfortable, Close hold, Recognizes feeding cues, Nipple round on release Infant Observation: Rhythmic suck, Relaxed after feeding, Opens mouth, Lips flanged, upper, Lips flanged, lower, Latches nipple and aereolae, Frenulum checked, Feeding cues, Audible swallows LATCH Documentation Latch: Grasps breast, tongue down, lips flanged, rhythmic sucking Audible Swallowing: Spontaneous and intermittent (24 hours old) Type of Nipple: Everted (after stimulation) Comfort (Breast/Nipple): Soft/non-tender Hold (Positioning): Full assist, teach one side, mother does other, staff holds LATCH Score: 9

## 2018-11-17 VITALS
TEMPERATURE: 98.6 F | WEIGHT: 253 LBS | HEIGHT: 68 IN | DIASTOLIC BLOOD PRESSURE: 59 MMHG | BODY MASS INDEX: 38.34 KG/M2 | SYSTOLIC BLOOD PRESSURE: 118 MMHG | RESPIRATION RATE: 16 BRPM | HEART RATE: 75 BPM

## 2018-11-17 RX ORDER — IBUPROFEN 800 MG/1
800 TABLET ORAL EVERY 8 HOURS
Qty: 30 TAB | Refills: 0 | Status: SHIPPED | OUTPATIENT
Start: 2018-11-17

## 2018-11-17 NOTE — DISCHARGE SUMMARY
Patient ID:  Rai Lynn  [id number]  19 y.o.  1995    Admit Date: 11/15/2018    Discharge Date: 2018     Admitting Physician: Zane Reynoso MD  Attending Physician: Isidoro Decker MD    Admission Diagnoses:   1. Multigravida at term for augmentation of labor    Discharge Diagnoses: Same as above with  producing a viable infant. Information for the patient's :  Chad Santiago, Female [[id number]]             Additional Diagnoses: None. Maternal Labs:   Lab Results   Component Value Date/Time    HBsAg, External negtive 2018    HIV, External negative 2018    Rubella, External immune 2018    RPR, External non reactive 2018    GrBStrep, External positive 10/12/2018       Cord Blood Results:   Information for the patient's :  Chad Santiago, Female [[id number]]   No results found for: PCTABR, ABORH, PCTDIG, BILI, ABORH, ABORHEXT          History of Present Illness:   OB History      Para Term  AB Living    4 4 3     3    SAB TAB Ectopic Molar Multiple Live Births            0 3        Admitted for augmentation of labor. Hospital Course:   Patient was admitted as above and delivered via . Please the chart for details. The postpartum course was unremarkable. She was deemed stable for discharge home on day 2.         Signed:  Joselyn Kaur MD  2018  9:46 AM

## 2018-11-17 NOTE — DISCHARGE INSTRUCTIONS
POSTPARTUM DISCHARGE INSTRUCTIONS       Name:  Jaya Dill  YOB: 1995  Admission Diagnosis:  Pregnancy  Normal labor and delivery  Labor abnormal     Discharge Diagnosis:    Problem List as of 11/17/2018 Date Reviewed: 12/14/2012          Codes Class Noted - Resolved    Labor abnormal ICD-10-CM: O62.9  ICD-9-CM: 661.90  11/15/2018 - Present        Pregnancy ICD-10-CM: Z34.90  ICD-9-CM: V22.2  2/3/2017 - Present        Normal labor and delivery ICD-10-CM: O80  ICD-9-CM: 050  3/7/2015 - Present        Gestational hypertension ICD-10-CM: O13.9  ICD-9-CM: 642.30  2/4/2015 - Present        Rash and nonspecific skin eruption ICD-10-CM: R21  ICD-9-CM: 782.1  11/13/2012 - Present        RESOLVED: Labor and delivery, indication for care ICD-10-CM: O75.9  ICD-9-CM: 659.90  11/2/2011 - 11/13/2012            Attending Physician:  Melissa Viramontes MD    Delivery Type:  Vaginal Childbirth with Episiotomy, Laceration or Tear: What To Expect At [address] [place] [address] will slowly heal in the next few weeks. It is easy to get too tired and overwhelmed during the first weeks after your baby is born. Changes in your hormones can shift your mood without warning. You may find it hard to meet the extra demands on your energy and time. Take it easy on yourself. Follow-up care is a key part of your treatment and safety. Be sure to make and go to all appointments, and call your doctor if you are having problems. It's also a good idea to know your test results and keep a list of the medicines you take. How can you care for yourself at home? Vaginal Bleeding and Cramps  · After delivery, you will have a bloody discharge from the vagina. This will turn pink within a week and then white or yellow after about 10 days. It may last for 2 to 4 weeks or longer, until the uterus has healed. Use pads instead of tampons until you stop bleeding.   · Do not worry if you pass some blood clots, as long as they are smaller than a golf ball. If you have a tear or stitches in your vaginal area, change the pad at least every 4 hours to prevent soreness and infection. · You may have cramps for the first few days after childbirth. These are normal and occur as the uterus shrinks to normal size. Take an over-the-counter pain medicine, such as acetaminophen (Tylenol), ibuprofen (Advil, Motrin), or naproxen (Aleve), for cramps. Read and follow all instructions on the label. Do not take aspirin, because it can cause more bleeding. Do not take acetaminophen (Tylenol) and other acetaminophen containing medications (i.e. Percocet) at the same time. Episiotomy, Lacerations or Tears  · If you have stitches, they will dissolve on their own and do not need to be removed. · Put ice or a cold pack on your painful area for 10 to 20 minutes at a time, several times a day, for the first few days. Put a thin cloth between the ice and your skin. · Sit in a few inches of warm water (sitz bath) 3 times a day and after bowel movements. The warm water helps with pain and itching. If you do not have a tub, a warm shower might help. Breast fullness  · Your breasts may overfill (engorge) in the first few days after delivery. To help milk flow and to relieve pain, warm your breasts in the shower or by using warm, moist towels before nursing. · If you are not nursing, do not put warmth on your breasts or touch your breasts. Wear a tight bra or sports bra and use ice until the fullness goes away. This usually takes 2 to 3 days. · Put ice or a cold pack on your breast after nursing to reduce swelling and pain. Put a thin cloth between the ice and your skin. Activity  · Eat a balanced diet. Do not try to lose weight by cutting calories. Keep taking your prenatal vitamins, or take a multivitamin. · Get as much rest as you can. Try to take naps when your baby sleeps during the day. · Get some exercise every day.  But do not do any heavy exercise until your doctor says it is okay. · Wait until you are healed (about 4 to 6 weeks) before you have sexual intercourse. Your doctor will tell you when it is okay to have sex. · Talk to your doctor about birth control. You can get pregnant even before your period returns. Also, you can get pregnant while you are breast-feeding. Mental Health  · Many women get the \"baby blues\" during the first few days after childbirth. You may lose sleep, feel irritable, and cry easily. You may feel happy one minute and sad the next. Hormone changes are one cause of these emotional changes. Also, the demands of a new baby, along with visits from relatives or other family needs, add to a mother's stress. The \"baby blues\" often peak around the fourth day. Then they ease up in less than 2 weeks. · If your moodiness or anxiety lasts for more than 2 weeks, or if you feel like life is not worth living, you may have postpartum depression. This is different for each mother. Some mothers with serious depression may worry intensely about their infant's well-being. Others may feel distant from their child. Some mothers might even feel that they might harm their baby. A mother may have signs of paranoia, wondering if someone is watching her. · With all the changes in your life, you may not know if you are depressed. Pregnancy sometimes causes changes in how you feel that are similar to the symptoms of depression. · Symptoms of depression include:  · Feeling sad or hopeless and losing interest in daily activities. These are the most common symptoms of depression. · Sleeping too much or not enough. · Feeling tired. You may feel as if you have no energy. · Eating too much or too little. · [employer] ([employer]) offers a Warm line; Chat with the Expert phone sessions; Information and Articles about Pregnancy and Postpartum Mood Disorders;  Comprehensive List of Free Support Groups; Knowledgeable local coordinators who will offer support, information, and resources; Guide to Resources on [employer]; Calendar of events in the  mood disorders community; Latest News and Research; and [facility] [address] for [employer]. Remember - You are not alone; You are not to blame; With help, you will be well. 6-062-670-PPD(1045). WWW. POSTPARTUM. NET    · Writing or talking about death, such as writing suicide notes or talking about guns, knives, or pills. Keep the numbers for these national suicide hotlines: 5-496-451-TALK (3-849.246.4753) and 7-887-BFSBFEZ (1-888.542.5997). If you or someone you know talks about suicide or feeling hopeless, get help right away. Constipation and Hemorrhoids  Drink plenty of fluids, enough so that your urine is light yellow or clear like water. If you have kidney, heart, or liver disease and have to limit fluids, talk with your doctor before you increase the amount of fluids you drink. · Eat plenty of fiber each day. Have a bran muffin or bran cereal for breakfast, and try eating a piece of fruit for a mid-afternoon snack. · For painful, itchy hemorrhoids, put ice or a cold pack on the area several times a day for 10 minutes at a time. Follow this by putting a warm compress on the area for another 10 to 20 minutes or by sitting in a shallow, warm bath. When should you call for help? Call 911 anytime you think you may need emergency care. For example, call if:  · You are thinking of hurting yourself, your baby, or anyone else. · You passed out (lost consciousness). · You have symptoms of a blood clot in your lung (called a pulmonary embolism). These may include:  · Sudden chest pain. · Trouble breathing. · Coughing up blood. Call your doctor now or seek immediate medical care if:  · You have severe vaginal bleeding. · You are soaking through a pad each hour for 2 or more hours.   · Your vaginal bleeding seems to be getting heavier or is still bright red 4 days after delivery. · You are dizzy or lightheaded, or you feel like you may faint. · You are vomiting or cannot keep fluids down. · You have a fever. · You have new or more belly pain. · You pass tissue (not just blood). · Your vaginal discharge smells bad. · Your belly feels tender or full and hard. · Your breasts are continuously painful or red. · You feel sad, anxious, or hopeless for more than a few days. · You have sudden, severe pain in your belly. · You have symptoms of a blood clot in your leg (called a deep vein thrombosis), such as:  · Pain in your calf, back of the knee, thigh, or groin. · Redness and swelling in your leg or groin. · You have symptoms of preeclampsia, such as:  · Sudden swelling of your face, hands, or feet. · New vision problems (such as dimness or blurring). · A severe headache. · Your blood pressure is higher than it should be or rises suddenly. · You have new nausea or vomiting. Watch closely for changes in your health, and be sure to contact your doctor if you have any problems. Additional Information:  Postpartum Support    PARENTS:  Are you feeling sad or depressed? Is it difficult for you to enjoy yourself? Do you feel more irritable or tense? Do you feel anxious or panicky? Are you having difficulty bonding with your baby? Do you feel as if you are \"out of control\" or \"going crazy\"? Are you worried that you might hurt your baby or yourself? FAMILIES: Do you worry that something is wrong but don't know how to help? Do you think that your partner or spouse is having problems coping? Are you worried that it may never get better? While many women experience some mild mood change or \"the blues\" during or after the birth of a child, 1 in 9 women experience more significant symptoms of depression or anxiety. 1 in 10 Dads become depressed during the first year. Things you can do  Being a good parent includes taking care of yourself.   If you take care of yourself, you will be able to take better care of your baby and your family. · Talk to a counselor or healthcare provider who has training in  mood and anxiety problems. · Learn as much as you can about pregnancy and postpartum depression and anxiety. · Get support from family and friends. Ask for help when you need it. · Join a support group in your area or online. · Keep active by walking, stretching or whatever form of exercise helps you to feel better. · Get enough rest and time for yourself. · Eat a healthy diet. · Don't give up! It may take more than one try to get the right help you need. These are general instructions for a healthy lifestyle:    No smoking/ No tobacco products/ Avoid exposure to second hand smoke    Surgeon General's Warning:  Quitting smoking now greatly reduces serious risk to your health. Obesity, smoking, and sedentary lifestyle greatly increases your risk for illness    A healthy diet, regular physical exercise & weight monitoring are important for maintaining a healthy lifestyle    Recognize signs and symptoms of STROKE:    F-face looks uneven    A-arms unable to move or move unevenly    S-speech slurred or non-existent    T-time-call 911 as soon as signs and symptoms begin - DO NOT go       back to bed or wait to see if you get better - TIME IS BRAIN. I have had the opportunity to make my options or choices for discharge. I have received and understand these instructions.

## 2018-11-17 NOTE — PROGRESS NOTES
Post-Partum Day Number 2 Progress Note Patient doing well post-partum without significant complaints. Voiding without difficulty, normal lochia. Vitals:   
Patient Vitals for the past 24 hrs: 
 BP Temp Pulse Resp  
18 0450 118/59 98.6 °F (37 °C) 75 16  
18 2200 135/62 98.3 °F (36.8 °C) 79 16  
18 1428 129/67 97.9 °F (36.6 °C) 62 18 Temp (24hrs), Av.3 °F (36.8 °C), Min:97.9 °F (36.6 °C), Max:98.6 °F (37 °C) Vital signs stable, afebrile. Exam:  Patient without distress. Abdomen soft, fundus firm, nontender Lower extremities, DAHIANA nontender w/o edema Labs: No results found for this or any previous visit (from the past 24 hour(s)). Assessment and Plan:  PPD 2, stable, routine care; A+/RI 1. Discharge today-instructions reviewed.

## 2018-11-17 NOTE — LACTATION NOTE
This note was copied from a baby's chart. Biological Nurturing breastfeeding principles taught. How Biological Nurturing (BN) 
promotes optimal breastfeeding (BF) sessions discussed. Mother encouraged to seek comfortable semi-reclining breastfeeding positions. Infant placed frontally along maternal contour. Primitive innate feeding reflexes/behaviors of the  discussed. BN tips and techniques shared; assisted with comfortable breastfeeding positioning. Pt will successfully establish breastfeeding by feeding in response to early feeding cues  
or wake every 3h, will obtain deep latch, and will keep log of feedings/output. Taught to BF at hunger cues and or q 2-3 hrs and to offer 10-20 drops of hand expressed colostrum at any non-feeds. Breast Assessment Left Breast: Large Left Nipple: Intact, Everted Right Breast: Large Right Nipple: Everted, Intact Breast- Feeding Assessment Attends Breast-Feeding Classes: No 
Breast-Feeding Experience: No 
Breast Trauma/Surgery: No 
Type/Quality: Good Lactation Consultant Visits Breast-Feedings: Good Mother/Infant Observation Mother Observation: Alignment, Lets baby end feeding, Sleepy after feeding, Nipple round on release, Breast comfortable, Recognizes feeding cues, Holds breast, Close hold Infant Observation: Audible swallows, Latches nipple and aereolae, Relaxed after feeding, Lips flanged, lower, Rhythmic suck, Lips flanged, upper, Feeding cues, Frenulum checked, Opens mouth LATCH Documentation Latch: Grasps breast, tongue down, lips flanged, rhythmic sucking Audible Swallowing: Spontaneous and intermittent (24 hours old) Type of Nipple: Everted (after stimulation) Comfort (Breast/Nipple): Soft/non-tender Hold (Positioning): Full assist, teach one side, mother does other, staff holds LATCH Score: 9 Chart shows numerous feedings, void, stool WNL.   Discussed importance of monitoring outputs and feedings on first week of life. Discussed ways to tell if baby is  getting enough breast milk, ie  voids and stools, change in color of stool, and return to birth wt within 2 weeks. Follow up with pediatrician visit for weight check in 1-2 days (per AAP guidelines.)  Encouraged to call Warm Line  [phone] or [facility] at [phone] for any questions/problems that arise. Mother also given breastfeeding support group dates and times for any future needsAnticipatory guidance given. Questions answered. Discussed signs of baby's allergy, excema. Discussed engorgement management, when breast are soft and flat you are making more milk than when hard and engorged. If you should have to take a medication and MD says can't breast feed contact lactation office. Breast feed if you or the baby gets sick to pass along natural immunologic protection. Baby was fussy placed baby in bio position, baby contiinued to fuss,  Had Mom finger feed baby drops of EBM. Baby then latch in bio positon and fed well. Placed baby in biologic position and baby self latched with good sucking bursts.

## 2018-11-17 NOTE — LACTATION NOTE
This note was copied from a baby's chart. Instructed Mom to call [facility] when she gets ready to feed. Mom states\" the nurses gave her a bottle last night because she was fussy ir her belly hurt.

## 2018-11-17 NOTE — PROGRESS NOTES
Patient discharged home in stable condition, Discharge paper work reviewed and signed, bands verified and cut, patient understands when to follow up for herself and the infant, she understands that she should not take percocet and tylenol together, no further questions,gift pack given, infant placed in car seat by parents, patient escorted out by volunteers

## 2024-09-30 NOTE — IP AVS SNAPSHOT
Berto Pitts 
 
 
 [address] [place] [address] 
[phone] Patient: Oniel Ashford MRN: [id number] :1995 You are allergic to the following Allergen Reactions Dilaudid (Hydromorphone (Bulk)) Rash Recent Documentation Height Weight Breastfeeding? BMI OB Status Smoking Status 1.727 m 107 kg Unknown 35.88 kg/m2 Recent pregnancy Former Smoker Unresulted Labs Order Current Status SAMPLE TO BLOOD BANK In process Emergency Contacts Name Discharge Info Relation Home Work Mobile Sameera Cha [1]   [phone] About your hospitalization You were admitted on:  February 3, 2017 You last received care in the:  [facility] You were discharged on:  2017 Unit phone number:  [phone] Why you were hospitalized Your primary diagnosis was:  Not on File Your diagnoses also included:  Pregnancy Providers Seen During Your Hospitalizations Provider Role Specialty Primary office phone Ortiz Fitzpatrick MD Attending Provider Obstetrics & Gynecology [phone] Your Primary Care Physician (PCP) Primary Care Physician Office Phone Office Fax Anne-Marie Berg [phone] Follow-up Information Follow up With Details Comments Contact Info Ortiz Fitzpatrick MD In 6 weeks  [address] [place] [address] 
[phone] Current Discharge Medication List  
  
START taking these medications Dose & Instructions Dispensing Information Comments Morning Noon Evening Bedtime  
 ibuprofen 800 mg tablet Commonly known as:  MOTRIN Your next dose is: Today, Tomorrow Other:  _________ Dose:  800 mg Take 1 Tab by mouth every eight (8) hours as needed for Pain. Quantity:  30 Tab Refills:  1 oxyCODONE-acetaminophen 5-325 mg per tablet Commonly known as:  PERCOCET Your next dose is: Today, Tomorrow Other:  _________ Dose:  1-2 Tab Take 1-2 Tabs by mouth every six (6) hours as needed for Pain. Max Daily Amount: 8 Tabs. Quantity:  15 Tab Refills:  0 CONTINUE these medications which have NOT CHANGED Dose & Instructions Dispensing Information Comments Morning Noon Evening Bedtime PRENATAL DHA+COMPLETE PRENATAL -300 mg-mcg-mg Cmpk Generic drug:  PNV no.24-iron-folic acid-dha Your next dose is: Today, Tomorrow Other:  _________ Dose:  1 Tab Take 1 Tab by mouth daily. Refills:  0 Where to Get Your Medications Information on where to get these meds will be given to you by the nurse or doctor. ! Ask your nurse or doctor about these medications  
  ibuprofen 800 mg tablet  
 oxyCODONE-acetaminophen 5-325 mg per tablet Discharge Instructions Vaginal Childbirth: After Your Visit Your Care Instructions Your body will slowly heal in the next few weeks. It is easy to get too tired and overwhelmed during the first weeks after your baby is born. Changes in your hormones can shift your mood without warning. You may find it hard to meet the extra demands on your energy and time. Take it easy on yourself. Follow-up care is a key part of your treatment and safety. Be sure to make and go to all appointments, and call your doctor if you are having problems. It's also a good idea to know your test results and keep a list of the medicines you take. How can you care for yourself at home? · Vaginal bleeding and cramps ¨ After delivery, you will have a bloody discharge from the vagina. This will turn pink within a week and then white or yellow after about 10 days. It may last for 2 to 4 weeks or longer, until the uterus has healed.  Use pads instead of tampons until you stop bleeding. ¨ Do not worry if you pass some blood clots, as long as they are smaller than a golf ball. If you have a tear or stitches in your vaginal area, change the pad at least every 4 hours to prevent soreness and infection. ¨ You may have cramps for the first few days after childbirth. These are normal and occur as the uterus shrinks to normal size. Take an over-the-counter pain medicine, such as acetaminophen (Tylenol), ibuprofen (Advil, Motrin), or naproxen (Aleve), for cramps. Read and follow all instructions on the label. Do not take aspirin, because it can cause more bleeding. ¨ Do not take two or more pain medicines at the same time unless the doctor told you to. Many pain medicines have acetaminophen, which is Tylenol. Too much acetaminophen (Tylenol) can be harmful. · Stitches ¨ If you have stitches, they will dissolve on their own and do not need to be removed. Follow your doctor's instructions for cleaning the stitched area. ¨ Put ice or a cold pack on your painful area for 10 to 20 minutes at a time, several times a day, for the first few days. Put a thin cloth between the ice and your skin. ¨ Sit in a few inches of warm water (sitz bath) 3 times a day and after bowel movements. The warm water helps with pain and itching. If you do not have a tub, a warm shower might help. · Breast fullness ¨ Your breasts may overfill (engorge) in the first few days after delivery. To help milk flow and to relieve pain, warm your breasts in the shower or by using warm, moist towels before nursing. ¨ If you are not nursing, do not put warmth on your breasts or touch your breasts. Wear a tight bra or sports bra and use ice until the fullness goes away. This usually takes 2 to 3 days. ¨ Put ice or a cold pack on your breast after nursing to reduce swelling and pain. Put a thin cloth between the ice and your skin. · Activity ¨ Eat a balanced diet. Do not try to lose weight by cutting calories. Keep taking your prenatal vitamins, or take a multivitamin. ¨ Get as much rest as you can. Try to take naps when your baby sleeps during the day. ¨ Get some exercise every day. But do not do any heavy exercise until your doctor says it is okay. ¨ Wait until you are healed (about 4 to 6 weeks) before you have sexual intercourse. Your doctor will tell you when it is okay to have sex. ¨ Talk to your doctor about birth control. You can get pregnant even before your period returns. Also, you can get pregnant while you are breast-feeding. · Mental health ¨ It is normal to have some sadness, anxiety, sleeplessness, and mood swings after you go home. If you feel upset or hopeless for more than a few days or are having trouble doing the things you need to do, talk to your doctor. · Constipation and hemorrhoids ¨ Drink plenty of fluids, enough so that your urine is light yellow or clear like water. If you have kidney, heart, or liver disease and have to limit fluids, talk with your doctor before you increase the amount of fluids you drink. ¨ Eat plenty of fiber each day. Have a bran muffin or bran cereal for breakfast, and try eating a piece of fruit for a mid-afternoon snack. ¨ For painful, itchy hemorrhoids, put ice or a cold pack on the area several times a day for 10 minutes at a time. Follow this by putting a warm compress on the area for another 10 to 20 minutes or by sitting in a shallow, warm bath. When should you call for help? Call 911 anytime you think you may need emergency care. For example, call if: 
· You are thinking of hurting yourself, your baby, or anyone else. · You have sudden, severe pain in your belly. · You passed out (lost consciousness). Call your doctor now or seek immediate medical care if: 
· You have severe vaginal bleeding. · You are soaking through a pad each hour for 2 or more hours. · Your vaginal bleeding seems to be getting heavier or is still bright red 4 days after delivery. · You are dizzy or lightheaded, or you feel like you may faint. · You are vomiting or cannot keep fluids down. · You have a fever. · You have new or more belly pain. · You pass tissue (not just blood). · Your vaginal discharge smells bad. · Your belly feels tender or full and hard. · Your breasts are continuously painful or red. · You feel sad, anxious, or hopeless for more than a few days. Watch closely for changes in your health, and be sure to contact your doctor if you have any problems. Where can you learn more? Go to "[employer].be Enter Q359 in the search box to learn more about \"Vaginal Childbirth: After Your Visit. \"  
© 6667-1634 [employer]. Care instructions adapted under license by [employer] (which disclaims liability or warranty for this information). This care instruction is for use with your licensed healthcare professional. If you have questions about a medical condition or this instruction, always ask your healthcare professional. [address] any warranty or liability for your use of this information. Content Version: 86.8.606740; Current as of: February 20, 2015 Discharge Orders None [employer] Announcement We are excited to announce that we are making your provider's discharge notes available to you in Soup[employer]. You will see these notes when they are completed and signed by the physician that discharged you from your recent hospital stay. If you have any questions or concerns about any information you see in Soup[employer]ubes, please call the Health Information Department where you were seen or reach out to your Primary Care Provider for more information about your plan of care. Introducing [facility]!    
 [facility] introduces [employer] patient portal. Now you can access parts of your medical record, email your doctor's office, and request medication refills online. 1. In your internet browser, go to [email or web address] 2. Click on the First Time User? Click Here link in the Sign In box. You will see the New Member Sign Up page. 3. Enter your Youte[employer] Access Code exactly as it appears below. You will not need to use this code after youve completed the sign-up process. If you do not sign up before the expiration date, you must request a new code. · Youte[employer] Access Code: [email or web address] Expires: 4/15/2017  7:16 PM 
 
4. Enter the last four digits of your Social Security Number (xxxx) and Date of Birth (mm/dd/yyyy) as indicated and click Submit. You will be taken to the next sign-up page. 5. Create a Youtego ID. This will be your Youtego login ID and cannot be changed, so think of one that is secure and easy to remember. 6. Create a Youtego password. You can change your password at any time. 7. Enter your Password Reset Question and Answer. This can be used at a later time if you forget your password. 8. Enter your e-mail address. You will receive e-mail notification when new information is available in [address] 9. Click Sign Up. You can now view and download portions of your medical record. 10. Click the Download Summary menu link to download a portable copy of your medical information. If you have questions, please visit the Frequently Asked Questions section of the [employer] website. Remember, You[employer] is NOT to be used for urgent needs. For medical emergencies, dial 911. Now available from your iPhone and Android! General Information Please provide this summary of care documentation to your next provider. Patient Signature:  ____________________________________________________________ Date:  ____________________________________________________________  
  
[facility]  Provider Signature: ____________________________________________________________ Date:  ____________________________________________________________ prior to this recent fall [pt would go out walking very slowly and cautiously as have sopme balance issues per pt and only going short distances